# Patient Record
Sex: MALE | Race: BLACK OR AFRICAN AMERICAN | NOT HISPANIC OR LATINO | URBAN - METROPOLITAN AREA
[De-identification: names, ages, dates, MRNs, and addresses within clinical notes are randomized per-mention and may not be internally consistent; named-entity substitution may affect disease eponyms.]

---

## 2017-01-01 ENCOUNTER — OFFICE VISIT - HEALTHEAST (OUTPATIENT)
Dept: GERIATRICS | Facility: CLINIC | Age: 77
End: 2017-01-01

## 2017-01-01 ENCOUNTER — AMBULATORY - HEALTHEAST (OUTPATIENT)
Dept: GERIATRICS | Facility: CLINIC | Age: 77
End: 2017-01-01

## 2017-01-01 ENCOUNTER — RECORDS - HEALTHEAST (OUTPATIENT)
Dept: ADMINISTRATIVE | Facility: OTHER | Age: 77
End: 2017-01-01

## 2017-01-01 ENCOUNTER — OFFICE VISIT - HEALTHEAST (OUTPATIENT)
Dept: CARDIOLOGY | Facility: CLINIC | Age: 77
End: 2017-01-01

## 2017-01-01 ENCOUNTER — RECORDS - HEALTHEAST (OUTPATIENT)
Dept: LAB | Facility: CLINIC | Age: 77
End: 2017-01-01

## 2017-01-01 ENCOUNTER — AMBULATORY - HEALTHEAST (OUTPATIENT)
Dept: ADMINISTRATIVE | Facility: CLINIC | Age: 77
End: 2017-01-01

## 2017-01-01 DIAGNOSIS — E11.9 TYPE 2 DIABETES MELLITUS (H): ICD-10-CM

## 2017-01-01 DIAGNOSIS — E78.2 MIXED HYPERLIPIDEMIA: ICD-10-CM

## 2017-01-01 DIAGNOSIS — N18.30 CHRONIC KIDNEY DISEASE, STAGE 3 (H): ICD-10-CM

## 2017-01-01 DIAGNOSIS — D50.9 MICROCYTIC ANEMIA: ICD-10-CM

## 2017-01-01 DIAGNOSIS — R73.9 HYPERGLYCEMIA: ICD-10-CM

## 2017-01-01 DIAGNOSIS — N18.9 ACUTE ON CHRONIC KIDNEY FAILURE (H): ICD-10-CM

## 2017-01-01 DIAGNOSIS — L30.9 DERMATITIS: ICD-10-CM

## 2017-01-01 DIAGNOSIS — L01.1 IMPETIGINIZED ATOPIC DERMATITIS: ICD-10-CM

## 2017-01-01 DIAGNOSIS — G54.8 PHANTOM PAIN: ICD-10-CM

## 2017-01-01 DIAGNOSIS — I25.10 CAD (CORONARY ARTERY DISEASE): ICD-10-CM

## 2017-01-01 DIAGNOSIS — E78.5 HYPERLIPIDEMIA: ICD-10-CM

## 2017-01-01 DIAGNOSIS — I10 UNCONTROLLED HYPERTENSION: ICD-10-CM

## 2017-01-01 DIAGNOSIS — I10 ESSENTIAL HYPERTENSION: ICD-10-CM

## 2017-01-01 DIAGNOSIS — E11.40 DIABETIC NEUROPATHY (H): ICD-10-CM

## 2017-01-01 DIAGNOSIS — J45.909 ASTHMA: ICD-10-CM

## 2017-01-01 DIAGNOSIS — I25.10 CORONARY ARTERY DISEASE: ICD-10-CM

## 2017-01-01 DIAGNOSIS — J18.9 PNEUMONIA: ICD-10-CM

## 2017-01-01 DIAGNOSIS — E11.9 DM2 (DIABETES MELLITUS, TYPE 2) (H): ICD-10-CM

## 2017-01-01 DIAGNOSIS — I25.10 CORONARY ARTERY DISEASE INVOLVING NATIVE CORONARY ARTERY OF NATIVE HEART WITHOUT ANGINA PECTORIS: ICD-10-CM

## 2017-01-01 DIAGNOSIS — N18.9 CKD (CHRONIC KIDNEY DISEASE): ICD-10-CM

## 2017-01-01 DIAGNOSIS — G89.29 CHRONIC PAIN: ICD-10-CM

## 2017-01-01 DIAGNOSIS — R13.10 DYSPHAGIA: ICD-10-CM

## 2017-01-01 DIAGNOSIS — L89.93: ICD-10-CM

## 2017-01-01 DIAGNOSIS — I10 HTN (HYPERTENSION): ICD-10-CM

## 2017-01-01 DIAGNOSIS — I73.9 PVD (PERIPHERAL VASCULAR DISEASE) (H): ICD-10-CM

## 2017-01-01 DIAGNOSIS — L85.3 XEROSIS OF SKIN: ICD-10-CM

## 2017-01-01 DIAGNOSIS — D63.8 ANEMIA, CHRONIC DISEASE: ICD-10-CM

## 2017-01-01 DIAGNOSIS — R63.5 WEIGHT GAIN: ICD-10-CM

## 2017-01-01 DIAGNOSIS — D64.9 ANEMIA: ICD-10-CM

## 2017-01-01 DIAGNOSIS — R52 PHANTOM PAIN: ICD-10-CM

## 2017-01-01 DIAGNOSIS — N17.9 ACUTE ON CHRONIC KIDNEY FAILURE (H): ICD-10-CM

## 2017-01-01 DIAGNOSIS — R51.9 HEADACHE: ICD-10-CM

## 2017-01-01 DIAGNOSIS — A41.9 SEPSIS (H): ICD-10-CM

## 2017-01-01 LAB
ATRIAL RATE - MUSE: 60 BPM
CHOLEST SERPL-MCNC: 132 MG/DL
DIASTOLIC BLOOD PRESSURE - MUSE: NORMAL MMHG
FASTING STATUS PATIENT QL REPORTED: ABNORMAL
HBA1C MFR BLD: 9.4 % (ref 4.2–6.1)
HDLC SERPL-MCNC: 36 MG/DL
INTERPRETATION ECG - MUSE: NORMAL
LDLC SERPL CALC-MCNC: 53 MG/DL
P AXIS - MUSE: -4 DEGREES
PR INTERVAL - MUSE: 172 MS
QRS DURATION - MUSE: 72 MS
QT - MUSE: 412 MS
QTC - MUSE: 412 MS
R AXIS - MUSE: 24 DEGREES
SYSTOLIC BLOOD PRESSURE - MUSE: NORMAL MMHG
T AXIS - MUSE: 78 DEGREES
TRIGL SERPL-MCNC: 217 MG/DL
VENTRICULAR RATE- MUSE: 60 BPM

## 2017-01-01 RX ORDER — PETROLATUM 0.61 G/G
1 CREAM TOPICAL 2 TIMES DAILY
Status: SHIPPED | COMMUNITY
Start: 2017-01-01

## 2017-01-06 ENCOUNTER — OFFICE VISIT - HEALTHEAST (OUTPATIENT)
Dept: GERIATRICS | Facility: CLINIC | Age: 77
End: 2017-01-06

## 2017-01-06 DIAGNOSIS — I10 ESSENTIAL HYPERTENSION: ICD-10-CM

## 2017-01-06 DIAGNOSIS — I25.10 CORONARY ARTERY DISEASE: ICD-10-CM

## 2017-01-06 DIAGNOSIS — E11.9 TYPE 2 DIABETES MELLITUS (H): ICD-10-CM

## 2017-01-06 DIAGNOSIS — I73.9 PVD (PERIPHERAL VASCULAR DISEASE) (H): ICD-10-CM

## 2017-01-06 DIAGNOSIS — L02.31 ABSCESS OF RIGHT BUTTOCK: ICD-10-CM

## 2017-01-16 ENCOUNTER — RECORDS - HEALTHEAST (OUTPATIENT)
Dept: LAB | Facility: CLINIC | Age: 77
End: 2017-01-16

## 2017-01-17 LAB
CHOLEST SERPL-MCNC: 195 MG/DL
FASTING STATUS PATIENT QL REPORTED: YES
HBA1C MFR BLD: 10.2 % (ref 4.2–6.1)
HDLC SERPL-MCNC: 37 MG/DL
LDLC SERPL CALC-MCNC: 86 MG/DL
TRIGL SERPL-MCNC: 360 MG/DL

## 2017-02-09 ENCOUNTER — OFFICE VISIT - HEALTHEAST (OUTPATIENT)
Dept: GERIATRICS | Facility: CLINIC | Age: 77
End: 2017-02-09

## 2017-02-09 DIAGNOSIS — K21.9 GERD (GASTROESOPHAGEAL REFLUX DISEASE): ICD-10-CM

## 2017-02-09 DIAGNOSIS — J45.909 ASTHMA: ICD-10-CM

## 2017-02-09 DIAGNOSIS — N18.30 CHRONIC KIDNEY DISEASE, STAGE 3 (H): ICD-10-CM

## 2017-02-09 DIAGNOSIS — E11.9 TYPE 2 DIABETES MELLITUS (H): ICD-10-CM

## 2018-01-01 ENCOUNTER — OFFICE VISIT - HEALTHEAST (OUTPATIENT)
Dept: GERIATRICS | Facility: CLINIC | Age: 78
End: 2018-01-01

## 2018-01-01 ENCOUNTER — HOSPITAL ENCOUNTER (INPATIENT)
Dept: MEDSURG UNIT | Facility: CLINIC | Age: 78
Discharge: NURSING, REHAB OR LONG-TERM CARE | End: 2018-02-07
Attending: INTERNAL MEDICINE | Admitting: EMERGENCY MEDICINE

## 2018-01-01 DIAGNOSIS — L29.9 PRURITIC CONDITION: ICD-10-CM

## 2018-01-01 DIAGNOSIS — A41.9 SEVERE SEPSIS (H): ICD-10-CM

## 2018-01-01 DIAGNOSIS — E11.9 TYPE 2 DIABETES MELLITUS (H): ICD-10-CM

## 2018-01-01 DIAGNOSIS — N17.9 ACUTE ON CHRONIC RENAL FAILURE (H): ICD-10-CM

## 2018-01-01 DIAGNOSIS — D64.9 ANEMIA: ICD-10-CM

## 2018-01-01 DIAGNOSIS — N19 UREMIA: ICD-10-CM

## 2018-01-01 DIAGNOSIS — R65.20 SEVERE SEPSIS (H): ICD-10-CM

## 2018-01-01 DIAGNOSIS — G93.40 ENCEPHALOPATHY ACUTE: ICD-10-CM

## 2018-01-01 DIAGNOSIS — N18.9 ACUTE ON CHRONIC RENAL FAILURE (H): ICD-10-CM

## 2018-01-01 DIAGNOSIS — G89.29 CHRONIC PAIN: ICD-10-CM

## 2018-01-01 DIAGNOSIS — I10 ESSENTIAL HYPERTENSION: ICD-10-CM

## 2018-01-01 DIAGNOSIS — L89.90 DECUBITAL ULCER: ICD-10-CM

## 2018-01-01 DIAGNOSIS — K21.9 GERD (GASTROESOPHAGEAL REFLUX DISEASE): ICD-10-CM

## 2018-01-01 DIAGNOSIS — L30.9 DERMATITIS: ICD-10-CM

## 2018-01-01 DIAGNOSIS — E11.8 TYPE 2 DIABETES MELLITUS WITH COMPLICATION, UNSPECIFIED LONG TERM INSULIN USE STATUS: ICD-10-CM

## 2018-01-01 DIAGNOSIS — J10.1 INFLUENZA A: ICD-10-CM

## 2018-01-01 LAB
ABO/RH(D): NORMAL
AEROBIC BLOOD CULTURE BOTTLE: ABNORMAL
AEROBIC BLOOD CULTURE BOTTLE: ABNORMAL
ALBUMIN SERPL-MCNC: 2 G/DL (ref 3.5–5)
ALBUMIN SERPL-MCNC: 2 G/DL (ref 3.5–5)
ALBUMIN SERPL-MCNC: 2.1 G/DL (ref 3.5–5)
ALBUMIN SERPL-MCNC: 2.4 G/DL (ref 3.5–5)
ALBUMIN SERPL-MCNC: 2.9 G/DL (ref 3.5–5)
ALBUMIN UR-MCNC: ABNORMAL MG/DL
ALP SERPL-CCNC: 85 U/L (ref 45–120)
ALT SERPL W P-5'-P-CCNC: 11 U/L (ref 0–45)
ANAEROBIC BLOOD CULTURE BOTTLE: ABNORMAL
ANAEROBIC BLOOD CULTURE BOTTLE: NO GROWTH
ANION GAP SERPL CALCULATED.3IONS-SCNC: 11 MMOL/L (ref 5–18)
ANION GAP SERPL CALCULATED.3IONS-SCNC: 17 MMOL/L (ref 5–18)
ANION GAP SERPL CALCULATED.3IONS-SCNC: 7 MMOL/L (ref 5–18)
ANION GAP SERPL CALCULATED.3IONS-SCNC: 7 MMOL/L (ref 5–18)
ANION GAP SERPL CALCULATED.3IONS-SCNC: 8 MMOL/L (ref 5–18)
ANTIBODY SCREEN: NEGATIVE
APPEARANCE UR: ABNORMAL
APTT PPP: 35 SECONDS (ref 24–37)
AST SERPL W P-5'-P-CCNC: 18 U/L (ref 0–40)
ATRIAL RATE - MUSE: 113 BPM
ATRIAL RATE - MUSE: 77 BPM
BACTERIA #/AREA URNS HPF: ABNORMAL HPF
BACTERIA SPEC CULT: ABNORMAL
BACTERIA SPEC CULT: NORMAL
BASE EXCESS BLDV CALC-SCNC: 2.8 MMOL/L
BASOPHILS # BLD AUTO: 0 THOU/UL (ref 0–0.2)
BASOPHILS # BLD AUTO: 0.1 THOU/UL (ref 0–0.2)
BASOPHILS NFR BLD AUTO: 0 % (ref 0–2)
BASOPHILS NFR BLD AUTO: 1 % (ref 0–2)
BILIRUB SERPL-MCNC: 0.6 MG/DL (ref 0–1)
BILIRUB UR QL STRIP: NEGATIVE
BUN SERPL-MCNC: 109 MG/DL (ref 8–28)
BUN SERPL-MCNC: 25 MG/DL (ref 8–28)
BUN SERPL-MCNC: 28 MG/DL (ref 8–28)
BUN SERPL-MCNC: 41 MG/DL (ref 8–28)
BUN SERPL-MCNC: 65 MG/DL (ref 8–28)
C DIFF TOX B STL QL: NEGATIVE
CALCIUM SERPL-MCNC: 10.2 MG/DL (ref 8.5–10.5)
CALCIUM SERPL-MCNC: 8.3 MG/DL (ref 8.5–10.5)
CALCIUM SERPL-MCNC: 8.4 MG/DL (ref 8.5–10.5)
CALCIUM SERPL-MCNC: 8.6 MG/DL (ref 8.5–10.5)
CALCIUM SERPL-MCNC: 8.8 MG/DL (ref 8.5–10.5)
CHLORIDE BLD-SCNC: 106 MMOL/L (ref 98–107)
CHLORIDE BLD-SCNC: 113 MMOL/L (ref 98–107)
CHLORIDE BLD-SCNC: 115 MMOL/L (ref 98–107)
CHLORIDE BLD-SCNC: 116 MMOL/L (ref 98–107)
CHLORIDE BLD-SCNC: 121 MMOL/L (ref 98–107)
CO2 SERPL-SCNC: 22 MMOL/L (ref 22–31)
CO2 SERPL-SCNC: 22 MMOL/L (ref 22–31)
CO2 SERPL-SCNC: 23 MMOL/L (ref 22–31)
CO2 SERPL-SCNC: 24 MMOL/L (ref 22–31)
CO2 SERPL-SCNC: 25 MMOL/L (ref 22–31)
COLOR UR AUTO: YELLOW
CORTIS SERPL-MCNC: 33.7 UG/DL
CREAT SERPL-MCNC: 1.18 MG/DL (ref 0.7–1.3)
CREAT SERPL-MCNC: 1.27 MG/DL (ref 0.7–1.3)
CREAT SERPL-MCNC: 1.68 MG/DL (ref 0.7–1.3)
CREAT SERPL-MCNC: 2.24 MG/DL (ref 0.7–1.3)
CREAT SERPL-MCNC: 4.21 MG/DL (ref 0.7–1.3)
DIASTOLIC BLOOD PRESSURE - MUSE: NORMAL MMHG
DIASTOLIC BLOOD PRESSURE - MUSE: NORMAL MMHG
EOSINOPHIL # BLD AUTO: 0.1 THOU/UL (ref 0–0.4)
EOSINOPHIL COUNT (ABSOLUTE): 0 THOU/UL (ref 0–0.4)
EOSINOPHIL NFR BLD AUTO: 0 % (ref 0–6)
EOSINOPHIL NFR BLD AUTO: 0 % (ref 0–6)
ERYTHROCYTE [DISTWIDTH] IN BLOOD BY AUTOMATED COUNT: 13.6 % (ref 11–14.5)
ERYTHROCYTE [DISTWIDTH] IN BLOOD BY AUTOMATED COUNT: 13.7 % (ref 11–14.5)
GFR SERPL CREATININE-BSD FRML MDRD: 14 ML/MIN/1.73M2
GFR SERPL CREATININE-BSD FRML MDRD: 29 ML/MIN/1.73M2
GFR SERPL CREATININE-BSD FRML MDRD: 40 ML/MIN/1.73M2
GFR SERPL CREATININE-BSD FRML MDRD: 55 ML/MIN/1.73M2
GFR SERPL CREATININE-BSD FRML MDRD: 60 ML/MIN/1.73M2
GLUCOSE BLD-MCNC: 193 MG/DL (ref 70–125)
GLUCOSE BLD-MCNC: 198 MG/DL (ref 70–125)
GLUCOSE BLD-MCNC: 201 MG/DL (ref 70–125)
GLUCOSE BLD-MCNC: 249 MG/DL (ref 70–125)
GLUCOSE BLD-MCNC: 66 MG/DL (ref 70–125)
GLUCOSE BLDC GLUCOMTR-MCNC: 119 MG/DL
GLUCOSE BLDC GLUCOMTR-MCNC: 154 MG/DL
GLUCOSE BLDC GLUCOMTR-MCNC: 160 MG/DL
GLUCOSE BLDC GLUCOMTR-MCNC: 167 MG/DL
GLUCOSE BLDC GLUCOMTR-MCNC: 174 MG/DL
GLUCOSE BLDC GLUCOMTR-MCNC: 187 MG/DL
GLUCOSE BLDC GLUCOMTR-MCNC: 199 MG/DL
GLUCOSE BLDC GLUCOMTR-MCNC: 199 MG/DL
GLUCOSE BLDC GLUCOMTR-MCNC: 201 MG/DL
GLUCOSE BLDC GLUCOMTR-MCNC: 201 MG/DL
GLUCOSE BLDC GLUCOMTR-MCNC: 206 MG/DL
GLUCOSE BLDC GLUCOMTR-MCNC: 208 MG/DL
GLUCOSE BLDC GLUCOMTR-MCNC: 209 MG/DL
GLUCOSE BLDC GLUCOMTR-MCNC: 210 MG/DL
GLUCOSE BLDC GLUCOMTR-MCNC: 238 MG/DL
GLUCOSE BLDC GLUCOMTR-MCNC: 259 MG/DL
GLUCOSE BLDC GLUCOMTR-MCNC: 266 MG/DL
GLUCOSE BLDC GLUCOMTR-MCNC: 319 MG/DL
GLUCOSE BLDC GLUCOMTR-MCNC: 322 MG/DL
GLUCOSE BLDC GLUCOMTR-MCNC: 337 MG/DL
GLUCOSE BLDC GLUCOMTR-MCNC: 42 MG/DL
GLUCOSE BLDC GLUCOMTR-MCNC: 44 MG/DL
GLUCOSE BLDC GLUCOMTR-MCNC: 56 MG/DL
GLUCOSE BLDC GLUCOMTR-MCNC: 97 MG/DL
GLUCOSE UR STRIP-MCNC: NEGATIVE MG/DL
GRAM STAIN RESULT: ABNORMAL
GRAM STAIN RESULT: ABNORMAL
HCO3, VENOUS, CALC - HISTORICAL: 27.1 MMOL/L (ref 24–30)
HCT VFR BLD AUTO: 27.4 % (ref 40–54)
HCT VFR BLD AUTO: 31.2 % (ref 40–54)
HGB BLD-MCNC: 10.5 G/DL (ref 14–18)
HGB BLD-MCNC: 9 G/DL (ref 14–18)
HGB UR QL STRIP: ABNORMAL
INR PPP: 1.1 (ref 0.9–1.1)
INTERPRETATION ECG - MUSE: NORMAL
INTERPRETATION ECG - MUSE: NORMAL
KETONES UR STRIP-MCNC: NEGATIVE MG/DL
LACTATE SERPL-SCNC: 0.9 MMOL/L (ref 0.5–2.2)
LEUKOCYTE ESTERASE UR QL STRIP: ABNORMAL
LYMPHOCYTES # BLD AUTO: 1.9 THOU/UL (ref 0.8–4.4)
LYMPHOCYTES # BLD AUTO: 2.2 THOU/UL (ref 0.8–4.4)
LYMPHOCYTES NFR BLD AUTO: 13 % (ref 20–40)
LYMPHOCYTES NFR BLD AUTO: 16 % (ref 20–40)
MAGNESIUM SERPL-MCNC: 1.7 MG/DL (ref 1.8–2.6)
MAGNESIUM SERPL-MCNC: 2 MG/DL (ref 1.8–2.6)
MAGNESIUM SERPL-MCNC: 2 MG/DL (ref 1.8–2.6)
MAGNESIUM SERPL-MCNC: 2.1 MG/DL (ref 1.8–2.6)
MCH RBC QN AUTO: 27.4 PG (ref 27–34)
MCH RBC QN AUTO: 27.6 PG (ref 27–34)
MCHC RBC AUTO-ENTMCNC: 32.8 G/DL (ref 32–36)
MCHC RBC AUTO-ENTMCNC: 33.7 G/DL (ref 32–36)
MCV RBC AUTO: 82 FL (ref 80–100)
MCV RBC AUTO: 83 FL (ref 80–100)
MONOCYTES # BLD AUTO: 0.6 THOU/UL (ref 0–0.9)
MONOCYTES # BLD AUTO: 1.1 THOU/UL (ref 0–0.9)
MONOCYTES NFR BLD AUTO: 4 % (ref 2–10)
MONOCYTES NFR BLD AUTO: 7 % (ref 2–10)
MUCOUS THREADS #/AREA URNS LPF: ABNORMAL LPF
NEUTROPHILS # BLD AUTO: 11.4 THOU/UL (ref 2–7.7)
NEUTROPHILS NFR BLD AUTO: 79 % (ref 50–70)
NITRATE UR QL: NEGATIVE
OXYHEMOGLOBIN (VBG) - HISTORICAL: 95.4 % (ref 70–75)
OXYHGB MFR BLDV: 100 % (ref 70–75)
P AXIS - MUSE: 13 DEGREES
P AXIS - MUSE: 2 DEGREES
PCO2 BLDV: 40 MM HG (ref 35–50)
PH BLDV: 7.44 [PH] (ref 7.35–7.45)
PH UR STRIP: 7.5 [PH] (ref 4.5–8)
PHOSPHATE SERPL-MCNC: 1.2 MG/DL (ref 2.5–4.5)
PHOSPHATE SERPL-MCNC: 1.2 MG/DL (ref 2.5–4.5)
PHOSPHATE SERPL-MCNC: 1.9 MG/DL (ref 2.5–4.5)
PHOSPHATE SERPL-MCNC: 2.5 MG/DL (ref 2.5–4.5)
PLAT MORPH BLD: NORMAL
PLATELET # BLD AUTO: 197 THOU/UL (ref 140–440)
PLATELET # BLD AUTO: 222 THOU/UL (ref 140–440)
PMV BLD AUTO: 10.8 FL (ref 8.5–12.5)
PMV BLD AUTO: 11.4 FL (ref 8.5–12.5)
PO2 BLDV: 124 MM HG (ref 25–47)
POTASSIUM BLD-SCNC: 3.2 MMOL/L (ref 3.5–5)
POTASSIUM BLD-SCNC: 3.4 MMOL/L (ref 3.5–5)
POTASSIUM BLD-SCNC: 3.4 MMOL/L (ref 3.5–5)
POTASSIUM BLD-SCNC: 3.5 MMOL/L (ref 3.5–5)
POTASSIUM BLD-SCNC: 3.7 MMOL/L (ref 3.5–5)
POTASSIUM BLD-SCNC: 3.7 MMOL/L (ref 3.5–5)
POTASSIUM BLD-SCNC: 4.1 MMOL/L (ref 3.5–5)
POTASSIUM BLD-SCNC: 4.2 MMOL/L (ref 3.5–5)
PR INTERVAL - MUSE: 136 MS
PR INTERVAL - MUSE: 138 MS
PROCALCITONIN SERPL-MCNC: 7.04 NG/ML (ref 0–0.49)
PROT SERPL-MCNC: 8.1 G/DL (ref 6–8)
QRS DURATION - MUSE: 80 MS
QRS DURATION - MUSE: 96 MS
QT - MUSE: 312 MS
QT - MUSE: 390 MS
QTC - MUSE: 427 MS
QTC - MUSE: 441 MS
R AXIS - MUSE: 14 DEGREES
R AXIS - MUSE: 32 DEGREES
RBC # BLD AUTO: 3.29 MILL/UL (ref 4.4–6.2)
RBC # BLD AUTO: 3.81 MILL/UL (ref 4.4–6.2)
RBC #/AREA URNS AUTO: ABNORMAL HPF
SHIGA TOXIN 1: NEGATIVE
SHIGA TOXIN 2: NEGATIVE
SODIUM SERPL-SCNC: 139 MMOL/L (ref 136–145)
SODIUM SERPL-SCNC: 144 MMOL/L (ref 136–145)
SODIUM SERPL-SCNC: 145 MMOL/L (ref 136–145)
SODIUM SERPL-SCNC: 146 MMOL/L (ref 136–145)
SODIUM SERPL-SCNC: 146 MMOL/L (ref 136–145)
SODIUM SERPL-SCNC: 148 MMOL/L (ref 136–145)
SODIUM SERPL-SCNC: 148 MMOL/L (ref 136–145)
SODIUM SERPL-SCNC: 150 MMOL/L (ref 136–145)
SODIUM SERPL-SCNC: 151 MMOL/L (ref 136–145)
SODIUM SERPL-SCNC: 151 MMOL/L (ref 136–145)
SODIUM SERPL-SCNC: 154 MMOL/L (ref 136–145)
SP GR UR STRIP: 1.01 (ref 1–1.03)
SQUAMOUS #/AREA URNS AUTO: ABNORMAL LPF
SYSTOLIC BLOOD PRESSURE - MUSE: NORMAL MMHG
SYSTOLIC BLOOD PRESSURE - MUSE: NORMAL MMHG
T AXIS - MUSE: 102 DEGREES
T AXIS - MUSE: 72 DEGREES
TOTAL NEUTROPHILS-ABS(DIFF): 11.1 THOU/UL (ref 2–7.7)
TOTAL NEUTROPHILS-REL(DIFF): 79 % (ref 50–70)
TROPONIN I SERPL-MCNC: 0.02 NG/ML (ref 0–0.29)
TROPONIN I SERPL-MCNC: <0.01 NG/ML (ref 0–0.29)
UROBILINOGEN UR STRIP-ACNC: ABNORMAL
VANCOMYCIN SERPL-MCNC: 7.4 UG/ML
VENTRICULAR RATE- MUSE: 113 BPM
VENTRICULAR RATE- MUSE: 77 BPM
WBC #/AREA URNS AUTO: >100 HPF
WBC CLUMPS #/AREA URNS HPF: PRESENT /[HPF]
WBC: 14 THOU/UL (ref 4–11)
WBC: 14.6 THOU/UL (ref 4–11)

## 2018-01-01 RX ORDER — INSULIN GLARGINE 100 [IU]/ML
35 INJECTION, SOLUTION SUBCUTANEOUS EVERY MORNING
Qty: 10 ML | Status: SHIPPED | OUTPATIENT
Start: 2018-01-01

## 2018-01-01 RX ORDER — CETIRIZINE HYDROCHLORIDE 10 MG/1
10 TABLET ORAL DAILY
Status: SHIPPED | COMMUNITY
Start: 2018-01-01

## 2018-01-01 RX ORDER — FUROSEMIDE 20 MG
10 TABLET ORAL DAILY
Status: SHIPPED | COMMUNITY
Start: 2018-01-01

## 2018-01-01 RX ORDER — ATORVASTATIN CALCIUM 40 MG/1
40 TABLET, FILM COATED ORAL AT BEDTIME
Status: SHIPPED | COMMUNITY
Start: 2018-01-01

## 2018-01-01 RX ORDER — INSULIN GLARGINE 100 [IU]/ML
20 INJECTION, SOLUTION SUBCUTANEOUS AT BEDTIME
Qty: 10 ML | Status: SHIPPED | OUTPATIENT
Start: 2018-01-01

## 2018-01-01 RX ORDER — FERROUS SULFATE 325(65) MG
1 TABLET ORAL
Status: SHIPPED | COMMUNITY
Start: 2018-01-01

## 2018-01-01 RX ORDER — AMLODIPINE BESYLATE 10 MG/1
10 TABLET ORAL DAILY
Qty: 30 TABLET | Refills: 0 | Status: SHIPPED | OUTPATIENT
Start: 2018-01-01

## 2018-01-01 RX ORDER — METOPROLOL TARTRATE 100 MG
100 TABLET ORAL 2 TIMES DAILY
Qty: 60 TABLET | Refills: 3 | Status: SHIPPED | OUTPATIENT
Start: 2018-01-01

## 2018-01-01 ASSESSMENT — MIFFLIN-ST. JEOR
SCORE: 825.62
SCORE: 813.82

## 2018-02-14 ENCOUNTER — AMBULATORY - HEALTHEAST (OUTPATIENT)
Dept: GERIATRICS | Facility: CLINIC | Age: 78
End: 2018-02-14

## 2021-05-31 VITALS — WEIGHT: 114 LBS

## 2021-06-01 VITALS — BODY MASS INDEX: 22.34 KG/M2 | HEIGHT: 60 IN | WEIGHT: 113.8 LBS
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
## 2021-06-15 NOTE — PROGRESS NOTES
Patient discharged to LTC with transport in a w/c at 1400.  Paperwork sent with transporter.  Patient also given his medications that were filled from the [facility].  Slip was signed and tubed to the [facility].  All questions answered.
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
## 2021-06-15 NOTE — PROGRESS NOTES
"  Clinical Nutrition Therapy Assessment Note    Reason for Assessment:   Nam Gilmore is a 77 y.o. male assessed by the registered Dietitian for wound.Dx: influenza A,uremia, encephalopathy. PMH: Rt AKA 8/2015; Lt AKA 4/2016, T2DM, DVT, CKD 3    Nutrition History:  Information obtained from chart. Pt  Sleeping, no family present  Patients diet prior to admission has been from [facility]. Recent food/fluid intake has been adequate per risk screen. Patient has been consuming the following supplements none noted. Patient has the following cultural/Latter-day food needs or preferences: none noted  Patient has the following food allergies or intolerances:nkfa    Current Nutrition Prescription:   Diet: diabetic, NDD1, just upgraded to thin liquids  From nectars/p SLP evaluation today  Supplements and Modulars:   Flush Orders:   Propofol Orders:  IV dextrose or Fluids:  dextrose 5% Last Rate: 75 mL/hr (02/05/18 0939)       Current Nutrition Intake:  Not yet noted. IVF providing 306 kcal/ 24 h    Anthropometrics:  Height: 3' 7\" (109.2 cm)- Hx Ht n/a s/p bilat AKA's  Admission weight:  Weight: 116 lb 6.4 oz (52.8 kg)     BMI indication: not accurate  Usual body weight :  Wt Readings from Last 6 Encounters:   02/05/18 116 lb 6.4 oz (52.8 kg)   08/23/17 114 lb (51.7 kg)   05/15/17 114 lb (51.7 kg)   08/02/16 100 lb (45.4 kg)   07/19/16 (!) 98 lb (44.5 kg)   06/13/16 (!) 95 lb (43.1 kg)     Weight History: stable x 6 months    Physical Findings:  The patient has the following physical signs which could indicate malnutrition: n/a       GI Status/Output:   The patient's GI symptoms include: none noted    Bowel Sounds present    Skin/Wound:  Benny score Benny Scale Score: (P) 11    Pressure ulcer/Decubitus ulcer was noted stage II coccyx per nursing     Medications:  Medications reviewed.    Labs:  Labs reviewed:    Results from last 7 days  Lab Units 02/04/18  0513   LN-WHITE BLOOD CELL COUNT thou/uL 14.6*   LN-HEMOGLOBIN g/dL 9.0* "   LN-HEMATOCRIT % 27.4*   LN-PLATELET COUNT thou/uL 197       Results from last 7 days  Lab Units 02/05/18  1151 02/05/18  0530   LN-SODIUM mmol/L 146* 148*   LN-POTASSIUM mmol/L  --  3.2*   LN-CHLORIDE mmol/L  --  116*   LN-CO2 mmol/L  --  25   LN-BLOOD UREA NITROGEN mg/dL  --  41*   LN-CREATININE mg/dL  --  1.68*   LN-CALCIUM mg/dL  --  8.4*       Results from last 7 days  Lab Units 02/05/18  0530  02/03/18  0911   LN-ALBUMIN g/dL 2.1*  < > 2.9*   LN-PROTEIN TOTAL g/dL  --   --  8.1*   LN-BILIRUBIN TOTAL mg/dL  --   --  0.6   LN-ALKALINE PHOSPHATASE U/L  --   --  85   LN-ALT (SGPT) U/L  --   --  11   LN-AST (SGOT) U/L  --   --  18   < > = values in this interval not displayed.            Results from last 7 days  Lab Units 02/05/18  0530   LN-MAGNESIUM mg/dL 2.0       Results from last 7 days  Lab Units 02/05/18  0530   LN-PHOSPHORUS mg/dL 1.2*       Hemoglobin A1c   Date Value Ref Range Status   08/22/2017 9.4 (H) 4.2 - 6.1 % Final   01/17/2017 10.2 (H) 4.2 - 6.1 % Final     Accuchecks; 208-201-206    Assessed Nutritional Needs:  Assessment weight is 53 kg, with a weight source of current    Estimated Energy Needs: 3476-7761 kcals daily per 30-35 kcal/kg     Estimated Protein Needs: 65-80 g daily, 1.2-1.5 g/kg.      Malnutrition: Not noted    Nutrition Risk Level: moderate risk    Nutrition dx:  Impaired nutrient utilization with increased needs for wound healing AEB presence of pressure ulcer, labs    Goal: wound healing, BG < 180 < 70, intake > 50% of meals and supplements    Intervention:ordered to start trials: Boost glucose 1 x d, Aroldo 1 pkt/d.  Recommend MVI w/ minerals qd, ZnS04 220 mg q d x 14 d, vitamin C 500 mg/d per MD to order    Monitoring:   Intake, BG, supplement acceptance, wound helaling    See Care Plan for Problems, Goals, and Interventions.
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
## 2021-06-15 NOTE — PROGRESS NOTES
Progress Note    Brief summary:   77 y.o. old male with past medical history of hypertension, diabetes, coronary artery disease, peripheral vascular disease status post bilateral AKA, CKD, hyperlipidemia presented from nursing home after he was found to have diminished responsiveness this morning.  Patient was recently started on treatment for influenza with Tamiflu on 1/31/2018. He had been having nausea, vomiting and diarrhea and developed high grade fever.pt was septic on presentation with lab significant for leukocytosis of 14, creatinine of 4.21.  CT chest/abdomen showed left lower lobe consolidation as well as severely distended bladder with concern for bladder outlet obstruction.  UA was suggestive of UTI.  Blood cultures were sent and patient was started on Zosyn and vancomycin for pneumonia. Blood culture grew strep pneumonia and urine culture grew E coli. Abx was changed to ceftriaxone. Repeat blood culture from 2/4/18 is now growing GNB     Assessment/Plan  Principal Problem:    Severe sepsis  Active Problems:    Influenza    Pneumonia of left lower lobe due to infectious organism    Acute encephalopathy    IVA (acute kidney injury)    Acute cystitis without hematuria    #Sepsis due to pneumonia and UTI: Presented with altered mental status and high fever.  Source of infection could be pneumonia as well as UTI.  urine cx growing E coli. Blood culture is growing Strep pneumonia.  Abx changed to ceftriaxone. Will change abx to cefdinir upon d/c. Repeat blood culture from 2/4/18, is growing GNB (after 48 hours).      #Influenza: recently started on tamiflu (1/31/18). Course stopped.      #IVA on CKD: Presented with creatinine of 4.21 (baseline of 1.2-1.5), likely ATN due to sepsis, prerenal with dehydration (vomiting and diarrhea) as well as possible urinary bladder outlet obstruction with prostate enlargement as suggested on CT scan.  resolved.       #Hypernatremia: Patient with normal sodium increased to  154 during admission.  Started on D5. It had improved with D5 and it was stopped but Na started going up again and D4 resumed. Continue D5 for now until Na <145.  Appreciate nephrology input      #Acute encephalopathy: Likely due to sepsis.    Some improvement. Started on diet after speech evaluation.      #Vomiting/diarrhea: likely due to UTI. Stool work up negative so far.  No further diarrhea or vomiting since admission.      #Essential HTN: on Norvasc, Lasix, Imdur, metoprolol.  Dose of norvasc increased       #Diabetes mellitus type 2:on lantus and SSI.  dose of lantus at bedtime dereased due to early morning hypoglycemia.       #CAD s/p stenting: on ASA, Imdur, metoprolol and nursing home.        #PVD s/p b/l AKA      #HLD    Subjective  Patient seen and examined  Complained of pain in the LUQ region.    Objective    Vital signs in last 24 hours  Temp:  [98.5  F (36.9  C)-99.8  F (37.7  C)] 99.4  F (37.4  C)  Heart Rate:  [70-91] 91  Resp:  [16-32] 16  BP: (147-180)/(67-82) 164/70  Weight:   113 lb 12.8 oz (51.6 kg)    Intake/Output last 3 shifts  I/O last 3 completed shifts:  In: 920.5 [P.O.:520; I.V.:400.5]  Out: 900 [Urine:900]  Intake/Output this shift:       Physical Exam  General appearance: alert and slowed mentation  Eyes: conjunctivae/corneas clear. PERRL, EOM's intact. Fundi benign.  Lungs: Clear to auscultation bilaterally  Heart: regular rate and rhythm, S1, S2 normal, no murmur, click, rub or gallop  Abdomen: soft, non-tender; bowel sounds normal; no masses,  no organomegaly  Extremities: Bilateral AKA  Neurologic: Grossly normal    Meds    amLODIPine  10 mg Oral DAILY     ascorbic acid (vitamin C)  500 mg Oral DAILY     atorvastatin  40 mg Oral QHS     cefTRIAXone (ROCEPHIN)  IV  2 g Intravenous Q24H     dextrose 5%  1,000 mL Intravenous Once     furosemide  10 mg Oral DAILY     heparin (PF)  5,000 Units Subcutaneous Q8H FIXED TIMES     insulin aspart (NovoLOG) injection   Subcutaneous TID with  meals     insulin glargine  20 Units Subcutaneous QHS     insulin glargine  35 Units Subcutaneous QAM     isosorbide mononitrate  60 mg Oral DAILY     Aroldo Therapuetic Nutrition  1 packet Oral DAILY     lidocaine HCl  5 mL Urethral Once     metoprolol tartrate  100 mg Oral BID     multivitamin with minerals  1 tablet Oral DAILY     senna-docusate  1 tablet Oral BID     sodium chloride  10-30 mL Intravenous Q8H FIXED TIMES     sodium chloride  3 mL Intravenous Line Care     sodium chloride 0.9%  30 mL/kg Intravenous Once     zinc sulfate  220 mg Oral Daily with lunch       Pertinent Labs   Lab Results: personally reviewed.   not applicable      Results from last 7 days  Lab Units 02/06/18  1215 02/06/18  0541 02/05/18  2155 02/05/18  1751  02/05/18  0530  02/04/18  0513   LN-SODIUM mmol/L 146*  --  148* 139  < > 148*  < > 154*   LN-POTASSIUM mmol/L 3.7 4.1 3.4*  --   < > 3.2*  --  3.7   LN-CHLORIDE mmol/L 115*  --   --   --   --  116*  --  121*   LN-CO2 mmol/L 24  --   --   --   --  25  --  22   LN-BLOOD UREA NITROGEN mg/dL 28  --   --   --   --  41*  --  65*   LN-CREATININE mg/dL 1.27  --   --   --   --  1.68*  --  2.24*   LN-CALCIUM mg/dL 8.3*  --   --   --   --  8.4*  --  8.8   < > = values in this interval not displayed.        Results from last 7 days  Lab Units 02/04/18  0513 02/03/18  0911   LN-WHITE BLOOD CELL COUNT thou/uL 14.6* 14.0*   LN-HEMOGLOBIN g/dL 9.0* 10.5*   LN-HEMATOCRIT % 27.4* 31.2*   LN-PLATELET COUNT thou/uL 197 222         Pertinent Radiology   Radiology Results: Personally reviewed impression/s    Ct Chest Abdomen Pelvis Without Oral Without Iv Contrast    Result Date: 2/3/2018  CT CHEST, ABDOMEN, AND PELVIS 2/3/2018 11:00 AM      INDICATION: Worsening respirations in breathing. Influenza positive. Diarrhea. TECHNIQUE: CT chest, abdomen, and pelvis. Dose reduction techniques were used. IV CONTRAST: None. COMPARISON: None. FINDINGS: CHEST: Right PICC tip in the mid right atrium. Mild  cardiomegaly. Low-density cardiac blood suggesting anemia. Severe coronary calcifications. No pericardial effusion. Trace pleural effusions. Left lower lobe predominant consolidation and opacities. Minimal opacity in the lingula and right lower lobe. Lower lobe predominant mild airway thickening. No significant adenopathy.  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 Nonobstructing renal calculi up 4-5 mm (roughly 6 calcifications). Cholelithiasis. Unremarkable noncontrast liver, pancreas, spleen and adrenals. Nonaneurysmal aorta with scattered plaque. No  free fluid or air. No adenopathy. PELVIS: Distended urinary bladder with mild wall thickening. Prostatomegaly. Diffuse colonic stool. Mild rectal wall thickening. No free fluid. Surgical changes right inguinal region. MUSCULOSKELETAL: Bony demineralization and degenerative changes.     CONCLUSION: 1.  Left lower lobe predominant infectious/inflammatory process, mild to moderate in burden 2.  Cardiomegaly. Coronary artery disease. 3.  Trace pleural effusions. 4.  Horseshoe kidney with mild hydronephrosis. Nonobstructing renal calcifications are present. No definitive obstructing process identified concerning hydroureteronephrosis. Findings could reflect severely distended bladder and bladder outlet obstruction (mild bladder wall thickening and prostatomegaly present). 5.  Cholelithiasis. 6.  Mild nonspecific rectal wall thickening. Neoplastic origin not excluded. 7.  Diffuse colonic stool. No obstruction.     Xr Chest 1 View Portable    Result Date: 2/3/2018  XR CHEST 1 VIEW PORTABLE 2/3/2018 9:51 AM INDICATION: Fever. COMPARISON: 7/1/2017. FINDINGS: Mildly low lung volumes with subtle left basilar opacities, likely atelectasis. No focal consolidation. No significant pleural effusion. Normal heart size.       EKG Results: personally reviewed.       DVT prophylaxis: sqh  Diet: dysphagia  Code Status: full      Advanced Care Planning:  Discharge  Planning discussed with patient  Anticipated LOS: 1-2 days  Barrier to discharge:hypernatremia, iv abx  Disposition:NF  Discussed care with patient and family for >35 minutes with greater than 50% of time spent in counseling and coordination of care.      Ewelina Berkowitz MD  Hospitalist  [phone]    This note was dictated using voice recognition software. Any grammatical or context distortions are unintentional and inherent to the software.

## 2021-06-15 NOTE — PROGRESS NOTES
Speech Language/Pathology  Speech Therapy Daily Progress Note    Patient presents as alert, lethargic and cooperative during this session.  An  was not applicable.    Objective  Patient more alert and responsive today, but still weak overall. He was able to feed himself prior this current illness, and now too weak to feed self.    Swallow:  Nectar: no overt s/s aspiration with 2 oz from straw.  Thin: no overt s/s aspiration with 6 oz straw.  Puree: prolonged but functional oral phase, no oral residue after swallow, no overt s/s aspiration  Soft food(toast): prolonged mastication leading to fatigue with eventual swallow with use of sips of thin.  Pt not interested in upgrading from puree yet d/t not feeling well and overall weakness.    Assessment  Pt not interested in upgrading from puree yet d/t not feeling well and overall weakness.  No overt s/s aspiration noted with thin.    Plan/Recommendations  Change diet to thin from nectar thick and continue puree d/t general weakness.    The [facility] Care Plan has been reviewed and goals have been revised.    25 dysphagia minutes     Zully Mcfarlane MS, CCC-SLP

## 2021-06-15 NOTE — PROGRESS NOTES
"RENAL  CC: F/U DEMETRI/CKD  S: Since last visit, there were no acute events overnight. He is more awake today. He denies pain and dyspena.     Assessment/ Recommendations:  76 yo aa male with h/o ckd 3, dm2, htn, cad, periph vascular disease s/p b/l aka, admit from care center with lethargy.    1. Demetri/ckd 3 - IMPROVED DEMETRI. expect demetri due to bladder outlet obstruction as rapidly improving with catalan placement.  Will need spont voiding trial pta.  Will cont ivf, change to d5w with severe hypernatremia today. Cont to renally dose meds, optimize hemodynamics, avoid nephrotoxins.  2. Hypernatremia - IMPROVED. due to lack of free water and ivf with normal saline. Free water deficit was 3.05 liters. Agree with restarting d5w at 50 ml/hr and recheck in am. Encourage oral intake as able.  d/c d5 if sodium <145.  3. Sepsis - with bladder outlet obstruction, gnr in urine, gpc in blood cx, await cx and sensitivities, cont broad spectrum abx,, id consult pending.  4. HTN - above goal.  amlodipine now at 10mg daily and trend.   5. Altered mental status - Improved, but not back to normal. expect multifactorial with sepsis, demetri, hypernatremia contributing. Cont with treatment of demetri, hypernatremia, sepsis, cont serial exam,  gabapentin stopped. Avoid pain meds/sedation as able.    Gamal Patino MD  [facility]  Pager: [phone]   Office: [phone]      /74 (Patient Position: Lying)  Pulse 88  Temp 98.5  F (36.9  C) (Oral)   Resp 16  Ht 3' 7\" (1.092 m)  Wt 113 lb 12.8 oz (51.6 kg)  SpO2 100%  BMI 43.27 kg/m2    I/O last 3 completed shifts:  In: 2185.5 [P.O.:860; I.V.:1075.5; IV Piggyback:250]  Out: 850 [Urine:850]    Physical Exam:   GENERAL: NAD  HEENT: NCAT, OP clear, MMM, pupils equal, sclerae not icteric.  RESP: Clear to auscultation bilaterally with no respiratory distress, normal effort.  CV: Regular rhythm, normal rate, no rub. S/p bilat AKAs no stump edema..    GI: Soft, NT/ND, no masses " or HSM  SKIN: No rash  PSYCH: lethargic, difficult to assess psych.   NEURO: sensation to light touch intact      Results from last 7 days  Lab Units 02/06/18  0541 02/05/18  2155 02/05/18  1751 02/05/18  1557 02/05/18  1151 02/05/18  0530  02/04/18  0513 02/03/18  0911   LN-SODIUM mmol/L  --  148* 139  --  146* 148*  < > 154* 145   LN-POTASSIUM mmol/L 4.1 3.4*  --  3.5  --  3.2*  --  3.7 4.2   LN-CHLORIDE mmol/L  --   --   --   --   --  116*  --  121* 106   LN-CO2 mmol/L  --   --   --   --   --  25  --  22 22   LN-BLOOD UREA NITROGEN mg/dL  --   --   --   --   --  41*  --  65* 109*   LN-CREATININE mg/dL  --   --   --   --   --  1.68*  --  2.24* 4.21*   LN-CALCIUM mg/dL  --   --   --   --   --  8.4*  --  8.8 10.2   LN-ALBUMIN g/dL  --   --   --   --   --  2.1*  --  2.4* 2.9*   LN-PROTEIN TOTAL g/dL  --   --   --   --   --   --   --   --  8.1*   LN-BILIRUBIN TOTAL mg/dL  --   --   --   --   --   --   --   --  0.6   LN-ALKALINE PHOSPHATASE U/L  --   --   --   --   --   --   --   --  85   LN-ALT (SGPT) U/L  --   --   --   --   --   --   --   --  11   LN-AST (SGOT) U/L  --   --   --   --   --   --   --   --  18   < > = values in this interval not displayed.    Results from last 7 days  Lab Units 02/04/18  0513 02/03/18  0911   LN-WHITE BLOOD CELL COUNT thou/uL 14.6* 14.0*   LN-HEMOGLOBIN g/dL 9.0* 10.5*   LN-HEMATOCRIT % 27.4* 31.2*   LN-PLATELET COUNT thou/uL 197 222         Scheduled Meds:    amLODIPine  10 mg Oral DAILY     ascorbic acid (vitamin C)  500 mg Oral DAILY     atorvastatin  40 mg Oral QHS     cefTRIAXone (ROCEPHIN)  IV  2 g Intravenous Q24H     heparin (PF)  5,000 Units Subcutaneous Q8H FIXED TIMES     insulin aspart (NovoLOG) injection   Subcutaneous TID with meals     insulin glargine  20 Units Subcutaneous QHS     insulin glargine  35 Units Subcutaneous QAM     isosorbide mononitrate  60 mg Oral DAILY     Aroldo Therapuetic Nutrition  1 packet Oral DAILY     lidocaine HCl  5 mL Urethral Once      metoprolol tartrate  50 mg Oral BID     multivitamin with minerals  1 tablet Oral DAILY     senna-docusate  1 tablet Oral BID     sodium chloride  10-30 mL Intravenous Q8H FIXED TIMES     sodium chloride  3 mL Intravenous Line Care     sodium chloride 0.9%  30 mL/kg Intravenous Once     zinc sulfate  220 mg Oral Daily with lunch     Continuous Infusions:    PRN Meds:.acetaminophen, acetaminophen, bacitracin, bisacodyl, dextrose 50 % (D50W), glucagon (human recombinant), magnesium hydroxide, nitroglycerin, ondansetron **OR** ondansetron, sodium chloride, sodium chloride, sodium chloride      Labs personally reviewed today during this evaluation.

## 2021-06-15 NOTE — PROGRESS NOTES
"RENAL  CC: F/U DEMETRI/CKD  S: Mr. Gilmore is significantly more alert and awake today. He is sitting up and conversing. Since last visit, there were no acute events overnight. He denies pain and dyspena.     Assessment/ Recommendations:  78 yo aa male with h/o ckd 3, dm2, htn, cad, periph vascular disease s/p b/l aka, admit from care center with lethargy.    1. Demetri/ckd 3 - IMPROVED DEMETRI. Continues to improve. Expect demetri due to bladder outlet obstruction as rapidly improving with catalan placement.  Will need spont voiding trial pta. Eating and drinking OK, no need for IV fluids. Cont to renally dose meds, optimize hemodynamics, avoid nephrotoxins.  2. Hypernatremia - IMPROVED. due to lack of free water and ivf with normal saline. Free water deficit was 3.05 liters. Encourage to continue eating and drinking.   3. Sepsis - with bladder outlet obstruction, gnr in urine, gpc in blood cx, await cx and sensitivities, cont broad spectrum abx. Per hospitalist.   4. HTN - Improved, no changes today. Continue amlodipine at 10mg daily as well as metoprolol 100mg BID and trend.   5. Altered mental status - Significantly improved. Expect multifactorial with sepsis, demetri, hypernatremia contributing. Avoid pain meds/sedation as able.    DEMETRI resolved and will sign off now. No nephrology follow-up needed at this point.     Gamal Patino MD  [facility]  Pager: [phone]   Office: [phone]      /84  Pulse 71  Temp 99.1  F (37.3  C) (Oral)   Resp 18  Ht 3' 7\" (1.092 m)  Wt 113 lb 12.8 oz (51.6 kg)  SpO2 99%  BMI 43.27 kg/m2    I/O last 3 completed shifts:  In: 235 [I.V.:235]  Out: 800 [Urine:800]    Physical Exam:   GENERAL: Calm, in NAD.   HEENT: NCAT, OP clear, MMM, pupils equal, sclerae not icteric.  RESP: Clear to auscultation bilaterally with no respiratory distress, normal effort.  CV: Regular rhythm, normal rate, no rub. S/p bilat AKAs no stump edema..    GI: Soft, NT/ND, no masses or HSM  SKIN: " No rash  PSYCH: Awake, alert.   NEURO: sensation to light touch intact      Results from last 7 days  Lab Units 02/07/18  0442 02/06/18  1215 02/06/18  0541 02/05/18  2155  02/05/18  0530  02/03/18  0911   LN-SODIUM mmol/L 144 146*  --  148*  < > 148*  < > 145   LN-POTASSIUM mmol/L 3.4* 3.7 4.1 3.4*  < > 3.2*  < > 4.2   LN-CHLORIDE mmol/L 113* 115*  --   --   --  116*  < > 106   LN-CO2 mmol/L 23 24  --   --   --  25  < > 22   LN-BLOOD UREA NITROGEN mg/dL 25 28  --   --   --  41*  < > 109*   LN-CREATININE mg/dL 1.18 1.27  --   --   --  1.68*  < > 4.21*   LN-CALCIUM mg/dL 8.6 8.3*  --   --   --  8.4*  < > 10.2   LN-ALBUMIN g/dL 2.0* 2.0*  --   --   --  2.1*  < > 2.9*   LN-PROTEIN TOTAL g/dL  --   --   --   --   --   --   --  8.1*   LN-BILIRUBIN TOTAL mg/dL  --   --   --   --   --   --   --  0.6   LN-ALKALINE PHOSPHATASE U/L  --   --   --   --   --   --   --  85   LN-ALT (SGPT) U/L  --   --   --   --   --   --   --  11   LN-AST (SGOT) U/L  --   --   --   --   --   --   --  18   < > = values in this interval not displayed.    Results from last 7 days  Lab Units 02/04/18  0513 02/03/18  0911   LN-WHITE BLOOD CELL COUNT thou/uL 14.6* 14.0*   LN-HEMOGLOBIN g/dL 9.0* 10.5*   LN-HEMATOCRIT % 27.4* 31.2*   LN-PLATELET COUNT thou/uL 197 222         Scheduled Meds:    amLODIPine  10 mg Oral DAILY     ascorbic acid (vitamin C)  500 mg Oral DAILY     atorvastatin  40 mg Oral QHS     cefdinir  300 mg Oral BID 06-16     dextrose 5%  1,000 mL Intravenous Once     furosemide  10 mg Oral DAILY     heparin (PF)  5,000 Units Subcutaneous Q8H FIXED TIMES     insulin aspart (NovoLOG) injection   Subcutaneous TID with meals     insulin glargine  20 Units Subcutaneous QHS     insulin glargine  35 Units Subcutaneous QAM     isosorbide mononitrate  60 mg Oral DAILY     Aroldo Therapuetic Nutrition  1 packet Oral DAILY     lidocaine HCl  5 mL Urethral Once     magnesium oxide  400 mg Oral BID     metoprolol tartrate  100 mg Oral BID      multivitamin with minerals  1 tablet Oral DAILY     senna-docusate  1 tablet Oral BID     sodium chloride  10-30 mL Intravenous Q8H FIXED TIMES     sodium chloride  3 mL Intravenous Line Care     sodium chloride 0.9%  30 mL/kg Intravenous Once     zinc sulfate  220 mg Oral Daily with lunch     Continuous Infusions:    PRN Meds:.acetaminophen, acetaminophen, bacitracin, bisacodyl, dextrose 50 % (D50W), glucagon (human recombinant), hydrALAZINE, magnesium hydroxide, nitroglycerin, ondansetron **OR** ondansetron, sodium chloride, sodium chloride, sodium chloride      Labs personally reviewed today during this evaluation.

## 2021-06-15 NOTE — PROGRESS NOTES
"RENAL  CC: F/U DEMETRI/CKD  S: Since last visit, there were no acute events overnight. He remains somnolent. ROS difficult to obtain. He denies pain and dyspena.     Assessment/ Recommendations:  78 yo aa male with h/o ckd 3, dm2, htn, cad, periph vascular disease s/p b/l aka, admit from care center with lethargy.    1. Demetri/ckd 3 - IMPROVED DEMETRI. expect demetri due to bladder outlet obstruction as rapidly improving with catalan placement.  Will need spont voiding trial pta.  Will cont ivf, change to d5w with severe hypernatremia today. Cont to renally dose meds, optimize hemodynamics, avoid nephrotoxins.  2. Hypernatremia - IMPROVED. due to lack of free water and ivf with normal saline. Free water deficit was 3.05 liters. Agree with d5w at 75 ml/hr, d/c d5 if sodium <145.  3. Sepsis - with bladder outlet obstruction, gnr in urine, gpc in blood cx, await cx and sensitivities, cont broad spectrum abx,, id consult pending.  4. HTN - above goal. Increase amlodipine to 10mg daily and trend.   5. Altered mental status - Improved, but not back to normal. expect multifactorial with sepsis, demetri, hypernatremia contributing. Cont with treatment of demetri, hypernatremia, sepsis, cont serial exam,  gabapentin stopped..  Avoid pain meds/sedation as able.    Gamal Patino MD  [facility]  Pager: [phone]   Office: [phone]      /66 (Patient Position: Lying)  Pulse 92  Temp 98.9  F (37.2  C) (Oral)   Resp 18  Ht 3' 7\" (1.092 m)  Wt 116 lb 6.4 oz (52.8 kg)  SpO2 99%  BMI 44.26 kg/m2    I/O last 3 completed shifts:  In: 1674.3 [P.O.:570; I.V.:1104.3]  Out: 3200 [Urine:3200]    Physical Exam:   GENERAL: calm and comfortable, NAD  HEENT: NCAT, OP clear, MMM, pupils equal, sclerae not icteric.  RESP: Clear to auscultation bilaterally with no respiratory distress, normal effort.  CV: Regular rhythm, normal rate, no rub. S/p bilat AKAs no stump edema..    GI: Soft, NT/ND, no masses or HSM  SKIN: No rash  PSYCH: " lethargic, difficult to assess psych.   NEURO: sensation to light touch intact      Results from last 7 days  Lab Units 02/05/18  1151 02/05/18  0530 02/04/18  2335  02/04/18  0513 02/03/18  0911   LN-SODIUM mmol/L 146* 148* 151*  < > 154* 145   LN-POTASSIUM mmol/L  --  3.2*  --   --  3.7 4.2   LN-CHLORIDE mmol/L  --  116*  --   --  121* 106   LN-CO2 mmol/L  --  25  --   --  22 22   LN-BLOOD UREA NITROGEN mg/dL  --  41*  --   --  65* 109*   LN-CREATININE mg/dL  --  1.68*  --   --  2.24* 4.21*   LN-CALCIUM mg/dL  --  8.4*  --   --  8.8 10.2   LN-ALBUMIN g/dL  --  2.1*  --   --  2.4* 2.9*   LN-PROTEIN TOTAL g/dL  --   --   --   --   --  8.1*   LN-BILIRUBIN TOTAL mg/dL  --   --   --   --   --  0.6   LN-ALKALINE PHOSPHATASE U/L  --   --   --   --   --  85   LN-ALT (SGPT) U/L  --   --   --   --   --  11   LN-AST (SGOT) U/L  --   --   --   --   --  18   < > = values in this interval not displayed.    Results from last 7 days  Lab Units 02/04/18  0513 02/03/18  0911   LN-WHITE BLOOD CELL COUNT thou/uL 14.6* 14.0*   LN-HEMOGLOBIN g/dL 9.0* 10.5*   LN-HEMATOCRIT % 27.4* 31.2*   LN-PLATELET COUNT thou/uL 197 222         Scheduled Meds:    amLODIPine  5 mg Oral DAILY     atorvastatin  40 mg Oral QHS     heparin (PF)  5,000 Units Subcutaneous Q8H FIXED TIMES     insulin aspart (NovoLOG) injection   Subcutaneous TID with meals     insulin glargine  30 Units Subcutaneous QHS     insulin glargine  35 Units Subcutaneous QAM     isosorbide mononitrate  60 mg Oral DAILY     Aroldo Therapuetic Nutrition  1 packet Oral DAILY     lidocaine HCl  5 mL Urethral Once     metoprolol tartrate  50 mg Oral BID     oseltamivir  30 mg Oral DAILY     piperacillin-tazobactam  2.25 g Intravenous Q6H     potassium chloride  10 mEq Intravenous Q1H     senna-docusate  1 tablet Oral BID     sodium chloride  10-30 mL Intravenous Q8H FIXED TIMES     sodium chloride  3 mL Intravenous Line Care     sodium chloride 0.9%  30 mL/kg Intravenous Once      Continuous Infusions:    dextrose 5% 75 mL/hr (02/05/18 1313)     PRN Meds:.acetaminophen, acetaminophen, bacitracin, bisacodyl, dextrose 50 % (D50W), glucagon (human recombinant), magnesium hydroxide, ondansetron **OR** ondansetron, sodium chloride, sodium chloride, sodium chloride      Labs personally reviewed today during this evaluation.
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
## 2021-06-15 NOTE — PROGRESS NOTES
Chart assessment in [place].  Patient admitted from [facility]. 512 in LTC.  He is on Medicaid and Charge Nurse Paul states that they will hold his bed.  Transportation to be determined.

## 2021-06-15 NOTE — DISCHARGE SUMMARY
[facility] MEDICINE  DISCHARGE SUMMARY     Primary Care Physician: Michael Duane Johnson, CNP  Admission Date: 2/3/2018   Discharge Provider: Dangelo Mathis Discharge Date: 2/7/2018   Diet: cardiac diet and diabetic diet Code Status: Full Code   Activity: as tolerated      Condition at Discharge: Stable      REASON FOR ADMISSION (See Admission Note for Details)   Sepsis    PRINCIPAL DISCHARGE DIAGNOSIS   Principal Problem:    Severe sepsis  Active Problems:    Influenza    Pneumonia of left lower lobe due to infectious organism    Acute encephalopathy    IVA (acute kidney injury)    Acute cystitis without hematuria        SIGNIFICANT FINDINGS (Imaging, labs):   See below    PENDING LABS   None    PROCEDURES ( this hospitalization only)    None    RECOMMENDATION FOR F/U VISIT   See below  Assess insulin needs    DISPOSITION ( home, home care, TCU...)   Home    SUMMARY OF HOSPITAL COURSE:      77 y.o. old male with past medical history ofHTN, DM2, CAD, PVD status post bilateral AKA, CKD and hyperlipidemia presented sepsis and IVA. CT chest/abdomen showed left lower lobe consolidation as well as severely distended bladder with concern for bladder outlet obstruction.  He is found to have UTI and pneumonia. Blood culture grew strep pneumonia and urine culture grew E coli. Abx was changed to ceftriaxone. Repeat blood culture from 2/4/18 is now growing GNB. He was followed by ID who recommends 7 days of oral cefdinir at [place]. He is stable for DC back to his care facility. His IVA resolved with supportive cares. Due to hypoglycemia his insulin is reduced as can be noted below and likely can be adjusted at time of close follow up with his PCP. Amlodipine dose increased to 10mg daily and metoprolol is increased to 100 BID for poorly controlled HTN while inpatient. There are no other changes to the patient's chronic medications.           Discharge Medications with Med changes:        Medication List      START  taking these medications          cefdinir 300 MG capsule   Quantity:  14 capsule   Dose:  300 mg   Commonly known as:  OMNICEF   300 mg, Oral, BID 06-16       * LANTUS 100 unit/mL injection   Quantity:  10 mL   Dose:  20 Units   Generic drug:  insulin glargine   20 Units, Subcutaneous, QHS, 11.65 Type 2 with hyperglycemia   Replaces:  insulin glargine 100 unit/mL injection       * LANTUS 100 unit/mL injection   Quantity:  10 mL   Dose:  35 Units   Generic drug:  insulin glargine   35 Units, Subcutaneous, QA, 11.65 Type 2 with hyperglycemia   Replaces:  insulin glargine 100 unit/mL injection       * Notice:  This list has 2 medication(s) that are the same as other medications prescribed for you. Read the directions carefully, and ask your doctor or other care provider to review them with you.      CHANGE how you take these medications          amLODIPine 10 MG tablet   Quantity:  30 tablet   Dose:  10 mg   Start taking on:  2/8/2018   Commonly known as:  NORVASC   10 mg, Oral, DAILY   What changed:    - medication strength  - how much to take       metoprolol tartrate 100 MG tablet   Quantity:  60 tablet   Dose:  100 mg   Commonly known as:  LOPRESSOR   100 mg, Oral, BID   What changed:    - medication strength  - how much to take         CONTINUE taking these medications          acetaminophen 500 MG tablet   Dose:  500 mg   Commonly known as:  TYLENOL   500 mg, Oral, Q6H PRN, Maximum dose of acetaminophen is 4000 mg/24 hours from all sources.       albuterol 90 mcg/actuation inhaler   Dose:  2 puff   Commonly known as:  PROAIR HFA;PROVENTIL HFA;VENTOLIN HFA   2 puffs, Inhalation, Q6H PRN       ascorbic acid (vitamin C) 250 MG tablet   Dose:  250 mg   Commonly known as:  VITAMIN C   250 mg, Oral, BID       aspirin 325 MG tablet   Dose:  325 mg   325 mg, Oral, QPM       atorvastatin 40 MG tablet   Dose:  40 mg   Commonly known as:  LIPITOR   40 mg, Oral, QHS       calcium-vitamin D 500 mg(1,250mg) -200 unit per  tablet   Dose:  1 tablet   1 tablet, Oral, BID with meals       cetirizine 10 MG tablet   Dose:  10 mg   Commonly known as:  ZyrTEC   10 mg, Oral, DAILY       eucerin Crea   Dose:  1 application   Generic drug:  white petrolatum-mineral oil   1 application, Topical (Top), BID       ferrous sulfate 325 (65 FE) MG tablet   Dose:  1 tablet   1 tablet, Oral, Daily with brkfst       furosemide 20 MG tablet   Dose:  10 mg   Commonly known as:  LASIX   10 mg, Oral, DAILY       gabapentin 300 MG capsule   Dose:  300 mg   Commonly known as:  NEURONTIN   300 mg, Oral, BID       insulin lispro 100 unit/mL injection   Commonly known as:  HumaLOG   Subcutaneous, TID, Sliding Scale: 200-249 = 2 Units 250-299 = 4 Units 300-349 = 6 Units 350-399 = 8 Units 400-449 = 10 Units >449 = 12 Units        isosorbide mononitrate 60 MG 24 hr tablet   Dose:  60 mg   Commonly known as:  IMDUR   60 mg, Oral, DAILY, Before a meal        multivitamin with minerals 9 mg iron-400 mcg Tab tablet   Dose:  1 tablet   Commonly known as:  THERA-M   1 tablet, Oral, DAILY       NICOTINAMIDE ORAL   Dose:  500 mg   500 mg, Oral, BID       nitroglycerin 0.4 MG SL tablet   Dose:  0.4 mg   Commonly known as:  NITROSTAT   0.4 mg, Sublingual, Q5 Min PRN, Place 1 tablet under the tongue every 5 minutes if needed for Chest Pain x3 doses.       polyethylene glycol 17 gram packet   Dose:  17 g   Commonly known as:  MIRALAX   17 g, Oral, DAILY       ranitidine 150 MG tablet   Dose:  150 mg   Commonly known as:  ZANTAC   150 mg, Oral, QHS         STOP taking these medications          insulin glargine 100 unit/mL injection   Commonly known as:  LANTUS   Replaced by:  LANTUS 100 unit/mL injection       insulin glargine 100 unit/mL injection   Commonly known as:  LANTUS   Replaced by:  LANTUS 100 unit/mL injection       oseltamivir 30 MG capsule   Commonly known as:  TAMIFLU                 Rationale for medication changes:      As above  Problem list from this hospital  stay:     Principal Problem:    Severe sepsis  Active Problems:    Influenza    Pneumonia of left lower lobe due to infectious organism    Acute encephalopathy    IVA (acute kidney injury)    Acute cystitis without hematuria        Consult/s:  ID and nephrology    Discharge Instructions:  Follow up with Primary Care Physician: 3-5 days to assess insulin needs         Discharge Orders  Activity as tolerated   Order Comments: Rest when tired     Call MD:  if symptoms get worse     Call MD for:  redness or swelling     Call MD:  if pain or burning when you urinate     Call MD for:  difficulty breathing, headache or visual disturbances     Call MD for:  temperature greater than 101     Call MD for:  severe uncontrolled pain     Call MD for:  constipation (difficulty having a bowel movement)     Call MD for:  dizziness, persistent nausea or vomiting     Call MD for:  weight gain - more than two pounds in a day or five pounds in a week     Discharge Follow Up - Primary Care Clinic   Order Specific Question Answer Comments   Follow-up in: 3-5 days      Discharge diet - Diabetic           Subjective      NAD. Denies any nausea, vomiting, abdominal pain, chest pain, SOB, DENNIS or orthopnea, no swelling, fevers, chills, confusion or headache.     Examination     Vital Signs in last 24 hours:   Temp:  [98  F (36.7  C)-99.4  F (37.4  C)] 99.1  F (37.3  C)  Heart Rate:  [57-91] 71  Resp:  [16-18] 18  BP: (123-181)/(56-89) 149/84  SpO2:  [94 %-100 %] 99 %      General: NAD  HEENT: Without congestion or inflammation  RESPIRATORY: Clear to auscultation, no wheezes or rales  CARDIOVASCULAR: S1, S2, without rub, or gallop   ABDOMEN: soft, non-tender   MUSCULOSKELETAL: Muscle and joints without inflammation.  NEUROLOGIC: No focal arm or leg weakness, speech is clear      Please see EMR for more detailed significant labs, imaging, consultant notes etc.  Total time spent on discharge: >30 minutes    Dangelo Mathis  [email or web address]  [phone]      CC:Michael Duane Johnson, RUPINDER      These orders have been electronically signed in accordance with aspened   Regulation set [id number] types of authentication   Dangelo Mathis D.O.

## 2021-06-15 NOTE — PROGRESS NOTES
Progress Note    Brief summary:   77 y.o. old male with past medical history of hypertension, diabetes, coronary artery disease, peripheral vascular disease status post bilateral AKA, CKD, hyperlipidemia presented from nursing home after he was found to have diminished responsiveness this morning.  Patient was recently started on treatment for influenza with Tamiflu on 1/31/2018. He had been having nausea, vomiting and diarrhea and developed high grade fever.pt was septic on presentation with lab significant for leukocytosis of 14, creatinine of 4.21.  CT chest/abdomen showed left lower lobe consolidation as well as severely distended bladder with concern for bladder outlet obstruction.  UA was suggestive of UTI.  Blood cultures were sent and patient was started on Zosyn and vancomycin for pneumonia. Blood culture grew strep pneumonia and urine culture grew E coli. Abx was changed to ceftriaxone.    Assessment/Plan  Principal Problem:    Severe sepsis  Active Problems:    Influenza    Pneumonia of left lower lobe due to infectious organism    Acute encephalopathy    IVA (acute kidney injury)    Acute cystitis without hematuria    #Sepsis due to pneumonia and UTI: Presented with altered mental status and high fever.  Source of infection could be pneumonia as well as UTI.  urine cx growing E coli. Blood culture is growing Strep pneumonia.  Abx changed to ceftriaxone. Will change abx to cefdinir upon d/c.      #Influenza: recently started on tamiflu (1/31/18). Will stop tamiflu since the pneumonia is due to bacterial cause and pt has already completed 5 days.      #IVA on CKD: Presented with creatinine of 4.21 (baseline of 1.2-1.5), likely ATN due to sepsis, prerenal with dehydration (vomiting and diarrhea) as well as possible urinary bladder outlet obstruction with prostate enlargement as suggested on CT scan.  Improved to 1.68 today      #Hypernatremia: Patient with normal sodium increased to 154 during admission.   Started on D5.  Slow improvement to 151-148-146.  Continue D5 for now until Na <145.  Appreciate nephrology input     #Acute encephalopathy: Likely due to sepsis.    Some improvement. Started on diet after speech evaluation.      #Vomiting/diarrhea: likely due to UTI. Stool work up negative so far.  No further diarrhea or vomiting since admission.      #Essential HTN: on Norvasc, Lasix, Imdur, metoprolol.  Dose of norvasc increased today due to elevated BP      #Diabetes mellitus type 2:on lantus and SSI.  Patient was getting lower dose of Lantus due to n.p.o. status but still had hypoglycemia of 60s on day 1 of admission..  Patient is on D5, should see increasing blood sugar levels.  Lantus dose adjusted     #CAD s/p stenting: on ASA, Imdur, metoprolol and nursing home.        #PVD s/p b/l AKA      #HLD    Subjective  Patient seen and examined  Complained of pain on his side, when asked to point, he pointed to his epigastric region.    Objective    Vital signs in last 24 hours  Temp:  [98.9  F (37.2  C)-99.6  F (37.6  C)] 99.6  F (37.6  C)  Heart Rate:  [72-92] 75  Resp:  [16-20] 16  BP: (151-185)/(53-82) 159/56  Weight:   116 lb 6.4 oz (52.8 kg)    Intake/Output last 3 shifts  I/O last 3 completed shifts:  In: 2133 [P.O.:700; I.V.:1183; IV Piggyback:250]  Out: 1300 [Urine:1300]  Intake/Output this shift:       Physical Exam  General appearance: alert and slowed mentation  Eyes: conjunctivae/corneas clear. PERRL, EOM's intact. Fundi benign.  Lungs: Clear to auscultation bilaterally  Heart: regular rate and rhythm, S1, S2 normal, no murmur, click, rub or gallop  Abdomen: soft, non-tender; bowel sounds normal; no masses,  no organomegaly  Extremities: Bilateral AKA  Neurologic: Grossly normal    Meds    [START ON 2/6/2018] amLODIPine  10 mg Oral DAILY     atorvastatin  40 mg Oral QHS     cefTRIAXone (ROCEPHIN)  IV  2 g Intravenous Q24H     heparin (PF)  5,000 Units Subcutaneous Q8H FIXED TIMES     insulin aspart  (NovoLOG) injection   Subcutaneous TID with meals     insulin glargine  30 Units Subcutaneous QHS     insulin glargine  35 Units Subcutaneous QAM     isosorbide mononitrate  60 mg Oral DAILY     Aroldo Therapuetic Nutrition  1 packet Oral DAILY     lidocaine HCl  5 mL Urethral Once     metoprolol tartrate  50 mg Oral BID     oseltamivir  30 mg Oral DAILY     senna-docusate  1 tablet Oral BID     sodium chloride  10-30 mL Intravenous Q8H FIXED TIMES     sodium chloride  3 mL Intravenous Line Care     sodium chloride 0.9%  30 mL/kg Intravenous Once       Pertinent Labs   Lab Results: personally reviewed.   not applicable      Results from last 7 days  Lab Units 02/05/18  1151 02/05/18  0530 02/04/18  2335  02/04/18  0513 02/03/18  0911   LN-SODIUM mmol/L 146* 148* 151*  < > 154* 145   LN-POTASSIUM mmol/L  --  3.2*  --   --  3.7 4.2   LN-CHLORIDE mmol/L  --  116*  --   --  121* 106   LN-CO2 mmol/L  --  25  --   --  22 22   LN-BLOOD UREA NITROGEN mg/dL  --  41*  --   --  65* 109*   LN-CREATININE mg/dL  --  1.68*  --   --  2.24* 4.21*   LN-CALCIUM mg/dL  --  8.4*  --   --  8.8 10.2   < > = values in this interval not displayed.        Results from last 7 days  Lab Units 02/04/18  0513 02/03/18  0911   LN-WHITE BLOOD CELL COUNT thou/uL 14.6* 14.0*   LN-HEMOGLOBIN g/dL 9.0* 10.5*   LN-HEMATOCRIT % 27.4* 31.2*   LN-PLATELET COUNT thou/uL 197 222         Pertinent Radiology   Radiology Results: Personally reviewed impression/s    Ct Chest Abdomen Pelvis Without Oral Without Iv Contrast    Result Date: 2/3/2018  CT CHEST, ABDOMEN, AND PELVIS 2/3/2018 11:00 AM      INDICATION: Worsening respirations in breathing. Influenza positive. Diarrhea. TECHNIQUE: CT chest, abdomen, and pelvis. Dose reduction techniques were used. IV CONTRAST: None. COMPARISON: None. FINDINGS: CHEST: Right PICC tip in the mid right atrium. Mild cardiomegaly. Low-density cardiac blood suggesting anemia. Severe coronary calcifications. No pericardial  effusion. Trace pleural effusions. Left lower lobe predominant consolidation and opacities. Minimal opacity in the lingula and right lower lobe. Lower lobe predominant mild airway thickening. No significant adenopathy.  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 Nonobstructing renal calculi up 4-5 mm (roughly 6 calcifications). Cholelithiasis. Unremarkable noncontrast liver, pancreas, spleen and adrenals. Nonaneurysmal aorta with scattered plaque. No  free fluid or air. No adenopathy. PELVIS: Distended urinary bladder with mild wall thickening. Prostatomegaly. Diffuse colonic stool. Mild rectal wall thickening. No free fluid. Surgical changes right inguinal region. MUSCULOSKELETAL: Bony demineralization and degenerative changes.     CONCLUSION: 1.  Left lower lobe predominant infectious/inflammatory process, mild to moderate in burden 2.  Cardiomegaly. Coronary artery disease. 3.  Trace pleural effusions. 4.  Horseshoe kidney with mild hydronephrosis. Nonobstructing renal calcifications are present. No definitive obstructing process identified concerning hydroureteronephrosis. Findings could reflect severely distended bladder and bladder outlet obstruction (mild bladder wall thickening and prostatomegaly present). 5.  Cholelithiasis. 6.  Mild nonspecific rectal wall thickening. Neoplastic origin not excluded. 7.  Diffuse colonic stool. No obstruction.     Xr Chest 1 View Portable    Result Date: 2/3/2018  XR CHEST 1 VIEW PORTABLE 2/3/2018 9:51 AM INDICATION: Fever. COMPARISON: 7/1/2017. FINDINGS: Mildly low lung volumes with subtle left basilar opacities, likely atelectasis. No focal consolidation. No significant pleural effusion. Normal heart size.         EKG Results: personally reviewed.      DVT prophylaxis: sqh  Diet: dysphagia  Code Status: full     Advanced Care Planning:  Discharge Planning discussed with patient's JEYSON Salazar over the phone  Anticipated LOS: 1-2 days  Barrier to  discharge:hypernatremia, iv abx, AMS  Disposition:NF  Discussed care with patient and family for >35 minutes with greater than 50% of time spent in counseling and coordination of care.      Ewelina Berkowitz MD  Hospitalist  [phone]    This note was dictated using voice recognition software. Any grammatical or context distortions are unintentional and inherent to the software.
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
## 2021-06-15 NOTE — PROGRESS NOTES
Progress Note    Brief summary:   77 y.o. old male with past medical history of hypertension, diabetes, coronary artery disease, peripheral vascular disease status post bilateral AKA, CKD, hyperlipidemia presented from nursing home after he was found to have diminished responsiveness this morning.  Patient was recently started on treatment for influenza with Tamiflu on 1/31/2018. He had been having nausea, vomiting and diarrhea and developed high grade fever.pt was septic on presentation with lab significant for leukocytosis of 14, creatinine of 4.21.  CT chest/abdomen showed left lower lobe consolidation as well as severely distended bladder with concern for bladder outlet obstruction.  UA was suggestive of UTI.  Blood cultures were sent and patient was started on Zosyn and vancomycin for pneumonia.      Assessment/Plan  Principal Problem:    Severe sepsis  Active Problems:    Influenza    Pneumonia of left lower lobe due to infectious organism    Acute encephalopathy    IVA (acute kidney injury)    #Sepsis due to pneumonia vs UTI: Presented with altered mental status and high fever.  Source of infection could be pneumonia versus UTI.  Patient getting treated for influenza so that this could be influenza related pneumonia versus secondary bacterial pneumonia.  UA suggestive of UTI.    Blood culture is growing gram-positive cocci in chains.  Urine culture is growing gram-negative rods. Pro-calcitonin 7.04.  Currently on Zosyn and vancomycin.  Will continue Tamiflu.     #Influenza: recently started on tamiflu (1/31/18). Given this could be pneumonia due to influenza in a sick patient, will continue treatment beyond 5 days.     #IVA on CKD: Presented with creatinine of 4.21 (baseline of 1.2-1.5), likely ATN due to sepsis, prerenal with dehydration (vomiting and diarrhea) as well as possible urinary bladder outlet obstruction with prostate enlargement as suggested on CT scan.  Improved to 2.24 today with  catheterization    #Hypernatremia: Patient with normal sodium increased to 154 overnight.  Started on D5.  Slow improvement to 151.  Continue D5 for now.  Appreciate nephrology input     #Acute encephalopathy: Likely due to sepsis.    Some improvement today.  Started on diet after speech evaluation.     #Vomiting/diarrhea: likely due to UTI. Stool work up negative so far.  No further diarrhea or vomiting since admission.     #Essential HTN: on Norvasc, Lasix, Imdur, metoprolol.       #Diabetes mellitus type 2:on lantus and SSI.  Patient was getting lower dose of Lantus due to n.p.o. status but still had hypoglycemia of 60s.  Patient is on D5, should see increasing blood sugar levels.  Lantus dose adjusted     #CAD s/p stenting: on ASA, Imdur, metoprolol and nursing home.       #PVD s/p b/l AKA     #HLD    Subjective  Patient seen and examined.  More awake today as compared to yesterday but still lethargic  Denied any pain or discomfort    Objective    Vital signs in last 24 hours  Temp:  [98.9  F (37.2  C)-101.8  F (38.8  C)] 99.1  F (37.3  C)  Heart Rate:  [79-96] 79  Resp:  [18-30] 18  BP: (147-172)/(48-89) 147/48  Weight:   112 lb 14.4 oz (51.2 kg)    Intake/Output last 3 shifts  I/O last 3 completed shifts:  In: 806.3 [P.O.:210; I.V.:596.3]  Out: 2850 [Urine:2850]  Intake/Output this shift:  I/O this shift:  In: 3 [I.V.:3]  Out: -     Physical Exam  General appearance: alert and slowed mentation  Eyes: conjunctivae/corneas clear. PERRL, EOM's intact. Fundi benign.  Lungs: Crackles bilaterally more on the right side  Heart: regular rate and rhythm, S1, S2 normal, no murmur, click, rub or gallop  Abdomen: soft, non-tender; bowel sounds normal; no masses,  no organomegaly  Extremities: Bilateral AKA  Neurologic: Grossly normal    Meds    amLODIPine  5 mg Oral DAILY     atorvastatin  40 mg Oral QHS     heparin (PF)  5,000 Units Subcutaneous Q8H FIXED TIMES     insulin aspart (NovoLOG) injection   Subcutaneous TID  with meals     insulin glargine  20 Units Subcutaneous QHS     insulin glargine  30 Units Subcutaneous QAM     isosorbide mononitrate  60 mg Oral DAILY     lidocaine HCl  5 mL Urethral Once     metoprolol tartrate  50 mg Oral BID     oseltamivir  30 mg Oral Every Other Day     piperacillin-tazobactam  2.25 g Intravenous Q6H     senna-docusate  1 tablet Oral BID     sodium chloride  10-30 mL Intravenous Q8H FIXED TIMES     sodium chloride  3 mL Intravenous Line Care     sodium chloride 0.9%  30 mL/kg Intravenous Once     vancomycin intermittent dosing   Other Med Consult or Protocol       Pertinent Labs   Lab Results: personally reviewed.   not applicable      Results from last 7 days  Lab Units 02/04/18  1329 02/04/18  0513 02/03/18  0911   LN-SODIUM mmol/L 151* 154* 145   LN-POTASSIUM mmol/L  --  3.7 4.2   LN-CHLORIDE mmol/L  --  121* 106   LN-CO2 mmol/L  --  22 22   LN-BLOOD UREA NITROGEN mg/dL  --  65* 109*   LN-CREATININE mg/dL  --  2.24* 4.21*   LN-CALCIUM mg/dL  --  8.8 10.2           Results from last 7 days  Lab Units 02/04/18  0513 02/03/18  0911   LN-WHITE BLOOD CELL COUNT thou/uL 14.6* 14.0*   LN-HEMOGLOBIN g/dL 9.0* 10.5*   LN-HEMATOCRIT % 27.4* 31.2*   LN-PLATELET COUNT thou/uL 197 222         Pertinent Radiology   Radiology Results: Personally reviewed impression/s    Ct Chest Abdomen Pelvis Without Oral Without Iv Contrast    Result Date: 2/3/2018  CT CHEST, ABDOMEN, AND PELVIS 2/3/2018 11:00 AM      INDICATION: Worsening respirations in breathing. Influenza positive. Diarrhea. TECHNIQUE: CT chest, abdomen, and pelvis. Dose reduction techniques were used. IV CONTRAST: None. COMPARISON: None. FINDINGS: CHEST: Right PICC tip in the mid right atrium. Mild cardiomegaly. Low-density cardiac blood suggesting anemia. Severe coronary calcifications. No pericardial effusion. Trace pleural effusions. Left lower lobe predominant consolidation and opacities. Minimal opacity in the lingula and right lower lobe.  Lower lobe predominant mild airway thickening. No significant adenopathy.  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 Nonobstructing renal calculi up 4-5 mm (roughly 6 calcifications). Cholelithiasis. Unremarkable noncontrast liver, pancreas, spleen and adrenals. Nonaneurysmal aorta with scattered plaque. No  free fluid or air. No adenopathy. PELVIS: Distended urinary bladder with mild wall thickening. Prostatomegaly. Diffuse colonic stool. Mild rectal wall thickening. No free fluid. Surgical changes right inguinal region. MUSCULOSKELETAL: Bony demineralization and degenerative changes.     CONCLUSION: 1.  Left lower lobe predominant infectious/inflammatory process, mild to moderate in burden 2.  Cardiomegaly. Coronary artery disease. 3.  Trace pleural effusions. 4.  Horseshoe kidney with mild hydronephrosis. Nonobstructing renal calcifications are present. No definitive obstructing process identified concerning hydroureteronephrosis. Findings could reflect severely distended bladder and bladder outlet obstruction (mild bladder wall thickening and prostatomegaly present). 5.  Cholelithiasis. 6.  Mild nonspecific rectal wall thickening. Neoplastic origin not excluded. 7.  Diffuse colonic stool. No obstruction.     Xr Chest 1 View Portable    Result Date: 2/3/2018  XR CHEST 1 VIEW PORTABLE 2/3/2018 9:51 AM INDICATION: Fever. COMPARISON: 7/1/2017. FINDINGS: Mildly low lung volumes with subtle left basilar opacities, likely atelectasis. No focal consolidation. No significant pleural effusion. Normal heart size.       EKG Results: personally reviewed.     DVT prophylaxis: sqh  Diet: dysphagia  Code Status: full    Advanced Care Planning:  Discharge Planning discussed with patient's JEYSON Salazar over the phone  Anticipated LOS: TBD  Barrier to discharge:sepsis  Disposition:NF  Discussed care with patient and family for >35 minutes with greater than 50% of time spent in counseling and coordination of  care.      Ewelina Berkowitz MD  Hospitalist  [phone]    This note was dictated using voice recognition software. Any grammatical or context distortions are unintentional and inherent to the software.

## 2021-06-15 NOTE — PROGRESS NOTES
Speech Language/Pathology  Bedside Swallow Evaluation    Problem:  Patient Active Problem List   Diagnosis     Type 2 diabetes mellitus     PVD (peripheral vascular disease)     Diabetic neuropathy     Chronic kidney disease, stage 3     Microcytic anemia     Hypertension     Asthma     Amputation stump infection     Pain     GERD (gastroesophageal reflux disease)     Vascular insufficiency     Chronic pain     Hyperlipidemia     Tympanic membrane perforation, left     Dermatitis     Severe sepsis     Influenza     Pneumonia of left lower lobe due to infectious organism     Acute encephalopathy     IVA (acute kidney injury)       Onset date: 2/3/18  Reason for evaluation: assess swallow due to illness, lethargy  Pertinent History: as above  Current Diet: NPO  Baseline Diet: unknown    Patient presents as lethargic and cooperative during this session. Engaged throughout session, accepting spoon, straw.  An  was not applicable    Patient was given puree, nectar and thin.    Dentition/Oral hygiene: missing dentition    Oral motor function was weak. Generalized weakness.    Bolus prep and oral control was mildly impaired due to weakness. Mastication was slow but functional  and the patient used rotary chewing.    Anterior-Posterior transit was mildly impaired.    Oral stasis did not occur with any texture.    Pharyngeal Phase:    Puree: Patient trialed 2 ounces and presented with no s/s of aspiration. Initiation of swallow appeared timely. Hyolaryngeal movement appeared intact .    Nectar: Patient trialed 3 ounces by straw and presented with no s/s of aspiration. Initiation of swallow appeared timely. Hyolaryngeal movement appeared intact . Occasional multiple swallows.    Thin:Patient trialed 2 ounces by straw and presented with immediate throat clear x1. Initiation of swallow appeared mildly delayed. Hyolaryngeal movement appeared intact . Multiple swallows with each trial.    Ice chips:Patient trialed 2  bites by spoon and presented with immediate throat clear x1 on first trial of the eval. Initiation of swallow appeared mildly delayed. Hyolaryngeal movement appeared intact .         Assessment:    Patient presents with potential signs and symptoms of aspiration with thin liquid.    Patient demonstrated mild oral and possible pharyngeal dysphagia with occasional throat clear and multiple swallows.    Rehab potential is fair based on medical status.    Recommendations:    Plan: NDD1 and nectar thick liquids    Strategies: upright for all intake, monitor for s/s aspiration or respiratory distress during/following intake.    Speech therapy 4-6 times per week    Referrals: N/A    12 dysphagia minutes       José Miguel Almazan MA, CCC-SLP

## 2021-06-15 NOTE — PROGRESS NOTES
Pharmacy Note - Admission Medication History    Pertinent Provider Information:   1. Patient was recently started on Tamiflu - please address and adjust based off current renal function.    ______________________________________________________________________    Prior To Admission (PTA) med list completed and updated in EMR.       PTA Med List   Medication Sig Last Dose     acetaminophen (TYLENOL) 500 MG tablet Take 500 mg by mouth every 6 (six) hours as needed for pain. Maximum dose of acetaminophen is 4000 mg/24 hours from all sources. Taking     albuterol (PROVENTIL HFA;VENTOLIN HFA) 90 mcg/actuation inhaler Inhale 2 puffs every 6 (six) hours as needed for wheezing or shortness of breath.  Taking     amLODIPine (NORVASC) 5 MG tablet Take 5 mg by mouth daily. 2/2/2018 at AM     ascorbic acid (VITAMIN C) 250 MG tablet Take 250 mg by mouth 2 (two) times a day.  2/2/2018 at PM     aspirin 325 MG tablet Take 325 mg by mouth every evening. 2/2/2018 at PM     atorvastatin (LIPITOR) 40 MG tablet Take 40 mg by mouth at bedtime. 2/2/2018 at HS     calcium-vitamin D 500 mg(1,250mg) -200 unit per tablet Take 1 tablet by mouth 2 (two) times a day with meals. 2/2/2018 at PM     cetirizine (ZYRTEC) 10 MG tablet Take 10 mg by mouth daily. 2/2/2018 at AM     FA/NIACINAMIDE/CUPRIC OX/ZN OX (NICOTINAMIDE ORAL) Take 500 mg by mouth 2 (two) times a day. 2/2/2018 at 1600     ferrous sulfate 325 (65 FE) MG tablet Take 1 tablet by mouth daily with breakfast. 2/2/2018 at 0800     furosemide (LASIX) 20 MG tablet Take 10 mg by mouth daily. 2/2/2018 at am     gabapentin (NEURONTIN) 300 MG capsule Take 300 mg by mouth 2 (two) times a day.  2/2/2018 at PM     insulin glargine (LANTUS) 100 unit/mL injection Inject 40 Units under the skin at bedtime.  2/2/2018 at PM     insulin glargine (LANTUS) 100 unit/mL injection Inject 46 Units under the skin every morning.  2/2/2018 at AM     insulin lispro (HUMALOG) 100 unit/mL injection Inject under  the skin 3 (three) times a day. Sliding Scale:  200-249 = 2 Units  250-299 = 4 Units  300-349 = 6 Units  350-399 = 8 Units  400-449 = 10 Units  >449 = 12 Units 2/2/2018 at Unknown time     isosorbide mononitrate (IMDUR) 60 MG 24 hr tablet Take 60 mg by mouth daily. Before a meal 2/2/2018 at am     metoprolol tartrate (LOPRESSOR) 50 MG tablet Take 50 mg by mouth 2 (two) times a day. 2/2/2018 at PM     multivitamin with minerals (THERA-M) 9 mg iron-400 mcg Tab tablet Take 1 tablet by mouth daily.  2/2/2018 at PM     nitroglycerin (NITROSTAT) 0.4 MG SL tablet Place 0.4 mg under the tongue every 5 (five) minutes as needed for chest pain. Place 1 tablet under the tongue every 5 minutes if needed for Chest Pain x3 doses. Taking     oseltamivir (TAMIFLU) 30 MG capsule Take 30 mg by mouth every other day.  2/2/2018 at AM     polyethylene glycol (MIRALAX) 17 gram packet Take 17 g by mouth daily.  2/2/2018 at AM     ranitidine (ZANTAC) 150 MG tablet Take 150 mg by mouth at bedtime. 2/2/2018 at HS     white petrolatum-mineral oil (EUCERIN) Crea Apply 1 application topically 2 (two) times a day. 2/2/2018 at PM       Information source(s): Facility ([facility]/) medication list/MAR    Summary of Changes to PTA Med List  New: oseltamivir, cetirizine, ranitidine     Discontinued: loratadine  Changed: Lantus, ferrous sulfate    Patient was asked about OTC/herbal products specifically.  PTA med list reflects this.    Based on the pharmacist s assessment, the PTA med list information appears reliable    Patient appears adherent: Yes    Allergies were reviewed, assessed, and updated with the patient.      Patient did not bring any medications to the hospital and can't retrieve from home. No multi-dose medications are available for use during hospital stay.     Thank you for the opportunity to participate in the care of this patient.    Logan Welch, PharmD, BCPS  2/3/2018 10:05 AM
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
## 2021-06-16 PROBLEM — R65.20 SEVERE SEPSIS (H): Status: ACTIVE | Noted: 2018-01-01

## 2021-06-16 PROBLEM — L30.9 DERMATITIS: Status: ACTIVE | Noted: 2017-01-01

## 2021-06-16 PROBLEM — A41.9 SEVERE SEPSIS (H): Status: ACTIVE | Noted: 2018-01-01

## 2021-06-26 NOTE — PROGRESS NOTES
"Progress Notes by Boaz Feng MD at 2/6/2018 10:11 AM     Author: Boaz Feng MD Service: Infectious Disease Author Type: Physician    Filed: 2/6/2018 10:12 AM Date of Service: 2/6/2018 10:11 AM Status: Signed    : Boaz Feng MD (Physician)       Infectious Disease Progress Note    Assessment/Plan  Assessment:  Nam Gilmore is a 77 y.o. old male with Pneumococcal pneumonia and E. Coli uti.        Recommendations:   Changed abx to IV ceftriaxone.  When ready for discharge, change to po cefdinir for 7 days    We'll sign off.  Please call if questions or problems.        Principal Problem:    Severe sepsis  Active Problems:    Influenza    Pneumonia of left lower lobe due to infectious organism    Acute encephalopathy    IVA (acute kidney injury)    Acute cystitis without hematuria      Boaz Feng MD  [phone]      Subjective  Says he feels better.  Not coughing.    Objective    Vital signs in last 24 hours  Temp:  [98.5  F (36.9  C)-99.8  F (37.7  C)] 98.5  F (36.9  C)  Heart Rate:  [70-88] 88  Resp:  [16-32] 16  BP: (147-178)/(56-78) 152/74  Weight:   113 lb 12.8 oz (51.6 kg)    Intake/Output last 3 shifts  I/O last 3 completed shifts:  In: 2185.5 [P.O.:860; I.V.:1075.5; IV Piggyback:250]  Out: 850 [Urine:850]  Intake/Output this shift:       Review of Systems   Pertinent items are noted in HPI., otherwise negative.    Physical Exam  /74 (Patient Position: Lying)  Pulse 88  Temp 98.5  F (36.9  C) (Oral)   Resp 16  Ht 3' 7\" (1.092 m)  Wt 113 lb 12.8 oz (51.6 kg)  SpO2 100%  BMI 43.27 kg/m2  General appearance: alert, appears stated age, cooperative and slowed mentation, morbidly obese  Head: Normocephalic, without obvious abnormality, atraumatic  Eyes:  EOMI, no icterus.  Back: symmetric, no curvature. ROM normal. No CVA tenderness.  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  Skin: Skin color, texture, turgor normal. No rashes or lesions  Neurologic: Slow mentation, rue weakness.    Pertinent Labs   Lab Results: personally reviewed.       Results from last 7 days  Lab Units 02/04/18  0513 02/03/18  0911   LN-WHITE BLOOD CELL COUNT thou/uL 14.6* 14.0*   LN-HEMOGLOBIN g/dL 9.0* 10.5*   LN-HEMATOCRIT % 27.4* 31.2*   LN-PLATELET COUNT thou/uL 197 222        Results from last 7 days  Lab Units 02/06/18  0541 02/05/18  2155 02/05/18  1751 02/05/18  1557 02/05/18  1151 02/05/18  0530  02/04/18  0513 02/03/18  0911   LN-SODIUM mmol/L  --  148* 139  --  146* 148*  < > 154* 145   LN-POTASSIUM mmol/L 4.1 3.4*  --  3.5  --  3.2*  --  3.7 4.2   LN-CHLORIDE mmol/L  --   --   --   --   --  116*  --  121* 106   LN-CO2 mmol/L  --   --   --   --   --  25  --  22 22   LN-BLOOD UREA NITROGEN mg/dL  --   --   --   --   --  41*  --  65* 109*   LN-CREATININE mg/dL  --   --   --   --   --  1.68*  --  2.24* 4.21*   LN-CALCIUM mg/dL  --   --   --   --   --  8.4*  --  8.8 10.2   < > = values in this interval not displayed.  Microbiology Results (last 7 days)        Procedure Component Value Units Date/Time       Culture, Urine [[id number]] (Abnormal)  Collected: 02/03/18 1112       Order Status: Completed Specimen: Urine from Urine, Catheter Updated: 02/05/18 1000        Culture >100,000 col/ml Klebsiella pneumoniae (!)         Susceptibility         Klebsiella pneumoniae         JOSEPH        Amoxicillin + Clavulanate <=4/2  Sensitive        Ampicillin >16  Resistant        Cefazolin 2  Sensitive        Cefepime <=1  Sensitive        Ceftriaxone <=1  Sensitive        Ciprofloxacin <=0.5  Sensitive        Gentamicin <=2  Sensitive        Levofloxacin <=1  Sensitive        Meropenem <=0.25  Sensitive        Nitrofurantoin <=16  Sensitive        Tetracycline <=2  Sensitive         Tobramycin <=2  Sensitive        Trimethoprim + Sulfamethoxazole <=0.5  Sensitive                              Culture, Blood Positive Work-up [[id number]] (Abnormal)  Collected: 02/03/18 0911       Order Status: Completed Specimen: Blood Updated: 02/05/18 0735        Culture Streptococcus pneumoniae (!)         Reported and sent to [facility] as part of the  Emerging Infections Surveillance Program.           Gram Stain Result Gram positive cocci in chains         Susceptibility         Streptococcus pneumoniae         JOSEPH        Ceftriaxone <=0.063  Sensitive        Ceftriaxone (Meningitis) Sensitive        Ceftriaxone (NonMeningitis) Sensitive        Penicillin <=0.031  Sensitive        Penicillin g (Meningitis) Sensitive        Penicillin G (NonMeningitis) Sensitive        Penicillin G (Oral) Sensitive                              EnteroHaemorrhagic E. coli Work Up [[id number]] (Normal) Collected: 02/03/18 0912       Order Status: Completed Specimen: Stool from Per Rectum Updated: 02/04/18 1503        Shiga Toxin 1 Negative        Shiga Toxin 2 Negative       Culture, Stool [[id number]] Collected: 02/03/18 0912       Order Status: Completed Specimen: Stool from Per Rectum Updated: 02/04/18 1450        Culture Culture in progress       Narrative:           All specimens submitted for routine culture tested for Salmonella, Shigella, Yersinia, Campylobacter, and if applicable, Shiga toxin 1 and 2 producing Enterohemorrhagic E. coli.       C. difficile Toxigenic by PCR [[id number]] Collected: 02/03/18 0913       Order Status: Canceled Specimen: Stool from Per Rectum Updated: 02/03/18 0916        Pertinent Radiology   Radiology Results: Personally reviewed impression/s  No results found.

## 2021-06-26 NOTE — PROGRESS NOTES
"Progress Notes by Boaz Feng MD at 2/5/2018  3:06 PM     Author: Boaz Feng MD Service: Infectious Disease Author Type: Physician    Filed: 2/5/2018  3:09 PM Date of Service: 2/5/2018  3:06 PM Status: Signed    : Boaz Feng MD (Physician)       Infectious Disease Progress Note    Assessment/Plan  Assessment:  Nam Gilmore is a 77 y.o. old male with Pneumococcal pneumonia and E. Coli uti.        Recommendations:   Change abx to IV ceftriaxone.  When ready for discharge, change to po cefdinir for 7 days     Principal Problem:    Severe sepsis  Active Problems:    Influenza    Pneumonia of left lower lobe due to infectious organism    Acute encephalopathy    IVA (acute kidney injury)    Acute cystitis without hematuria      Boaz Feng MD  [phone]      Subjective  Sleepy, coughing.    Objective    Vital signs in last 24 hours  Temp:  [98.9  F (37.2  C)-99.6  F (37.6  C)] 99.6  F (37.6  C)  Heart Rate:  [72-92] 75  Resp:  [16-20] 16  BP: (151-185)/(53-82) 159/56  Weight:   116 lb 6.4 oz (52.8 kg)    Intake/Output last 3 shifts  I/O last 3 completed shifts:  In: 2133 [P.O.:700; I.V.:1183; IV Piggyback:250]  Out: 1300 [Urine:1300]  Intake/Output this shift:       Review of Systems   Pertinent items are noted in HPI., otherwise negative.    Physical Exam  /56 (Patient Position: Lying)  Pulse 75  Temp 99.6  F (37.6  C) (Oral)   Resp 16  Ht 3' 7\" (1.092 m)  Wt 116 lb 6.4 oz (52.8 kg)  SpO2 100%  BMI 44.26 kg/m2  General appearance: alert, appears stated age, cooperative and slowed mentation, morbidly obese  Head: Normocephalic, without obvious abnormality, atraumatic  Eyes:  EOMI, no icterus.  Back: symmetric, no curvature. ROM normal. No CVA tenderness.  Lungs: Crackles in left lung  Heart: regular rate and rhythm, S1, S2 normal, no murmur, click, rub or gallop  Abdomen: soft, non-tender; bowel sounds normal; no masses,  no organomegaly  Extremities: Status post " bilateral lower extremity amputation above the knee.  Skin: Skin color, texture, turgor normal. No rashes or lesions  Neurologic: Slow mentation    Pertinent Labs   Lab Results: personally reviewed.       Results from last 7 days  Lab Units 02/04/18  0513 02/03/18  0911   LN-WHITE BLOOD CELL COUNT thou/uL 14.6* 14.0*   LN-HEMOGLOBIN g/dL 9.0* 10.5*   LN-HEMATOCRIT % 27.4* 31.2*   LN-PLATELET COUNT thou/uL 197 222        Results from last 7 days  Lab Units 02/05/18  1151 02/05/18  0530 02/04/18  2335  02/04/18  0513 02/03/18  0911   LN-SODIUM mmol/L 146* 148* 151*  < > 154* 145   LN-POTASSIUM mmol/L  --  3.2*  --   --  3.7 4.2   LN-CHLORIDE mmol/L  --  116*  --   --  121* 106   LN-CO2 mmol/L  --  25  --   --  22 22   LN-BLOOD UREA NITROGEN mg/dL  --  41*  --   --  65* 109*   LN-CREATININE mg/dL  --  1.68*  --   --  2.24* 4.21*   LN-CALCIUM mg/dL  --  8.4*  --   --  8.8 10.2   < > = values in this interval not displayed.  Microbiology Results (last 7 days)        Procedure Component Value Units Date/Time       Culture, Urine [[id number]] (Abnormal)  Collected: 02/03/18 1112       Order Status: Completed Specimen: Urine from Urine, Catheter Updated: 02/05/18 1000        Culture >100,000 col/ml Klebsiella pneumoniae (!)         Susceptibility         Klebsiella pneumoniae         JOSEPH        Amoxicillin + Clavulanate <=4/2  Sensitive        Ampicillin >16  Resistant        Cefazolin 2  Sensitive        Cefepime <=1  Sensitive        Ceftriaxone <=1  Sensitive        Ciprofloxacin <=0.5  Sensitive        Gentamicin <=2  Sensitive        Levofloxacin <=1  Sensitive        Meropenem <=0.25  Sensitive        Nitrofurantoin <=16  Sensitive        Tetracycline <=2  Sensitive        Tobramycin <=2  Sensitive        Trimethoprim + Sulfamethoxazole <=0.5  Sensitive                              Culture, Blood Positive Work-up [[id number]] (Abnormal)  Collected: 02/03/18 0911       Order Status: Completed Specimen: Blood Updated:  02/05/18 0735        Culture Streptococcus pneumoniae (!)         Reported and sent to [facility] as part of the  Emerging Infections Surveillance Program.           Gram Stain Result Gram positive cocci in chains         Susceptibility         Streptococcus pneumoniae         JOSEPH        Ceftriaxone <=0.063  Sensitive        Ceftriaxone (Meningitis) Sensitive        Ceftriaxone (NonMeningitis) Sensitive        Penicillin <=0.031  Sensitive        Penicillin g (Meningitis) Sensitive        Penicillin G (NonMeningitis) Sensitive        Penicillin G (Oral) Sensitive                              EnteroHaemorrhagic E. coli Work Up [[id number]] (Normal) Collected: 02/03/18 0912       Order Status: Completed Specimen: Stool from Per Rectum Updated: 02/04/18 1503        Shiga Toxin 1 Negative        Shiga Toxin 2 Negative       Culture, Stool [[id number]] Collected: 02/03/18 0912       Order Status: Completed Specimen: Stool from Per Rectum Updated: 02/04/18 1450        Culture Culture in progress       Narrative:           All specimens submitted for routine culture tested for Salmonella, Shigella, Yersinia, Campylobacter, and if applicable, Shiga toxin 1 and 2 producing Enterohemorrhagic E. coli.       C. difficile Toxigenic by PCR [[id number]] Collected: 02/03/18 0913       Order Status: Canceled Specimen: Stool from Per Rectum Updated: 02/03/18 0916        Pertinent Radiology   Radiology Results: Personally reviewed impression/s  No results found.

## 2021-07-01 NOTE — PROCEDURES
"Procedures by Beth Gross RN at 2/3/2018 10:41 AM     Author: Beth Gross RN Service: Infusion Therapy Author Type: Registered Nurse    Filed: 2/3/2018 10:43 AM Date of Service: 2/3/2018 10:41 AM Status: Signed    : Beth Gross RN (Registered Nurse)       Procedures      PICC Line Insertion Procedure Note  Pt. Name: Nam Gilmore  MRN:        [id number]    Procedure: Insertion of a  triple Lumen  5 fr  Bard SOLO (valved) Power PICC, Lot number [id number]    Indications: multiple infusions, vascular access, sepsis    Contraindications : none noted    Procedure Details   Patient identified with 2 identifiers and \"Time Out\" conducted.  .     Central line insertion bundle followed: hand hygeine performed prior to procedure, site cleansed with cholraprep, hat, mask, sterile gloves,sterile gown worn, patient draped with maximum barrier head to toe drape, sterile field maintained.    The vein was assessed and found to be compressible and of adequate size. 3 ml 1% Lidocaine administered sq to the insertion site. A 5 Fr PICC was inserted into the basilic vein of the right arm with ultrasound guidance. 1 attempt(s) required to access vein.   Catheter threaded without difficulty. Good blood return noted.    Modified Seldinger Technique used for insertion.    The 8 sharps that are included in the PICC insertion kit were accounted for and disposed of in the sharps container prior to breakdown of the sterile field.    Catheter secured with Statlock, biopatch and Tegaderm dressing applied.    Findings:  Total catheter length  42 cm, with 2 cm exposed. Mid upper arm circumference is 31 cm. Catheter was flushed with 30 cc NS. Patient  tolerated procedure well.    Tip placement verified by 3CG technology . Tip placement in the SVC.      CLABSI prevention brochure left at bedside.    Patient's primary RN notified PICC is ready for use.    Comments:          Beth Gross RN    [facility] " access [phone]
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
